# Patient Record
Sex: MALE | Race: BLACK OR AFRICAN AMERICAN | NOT HISPANIC OR LATINO | Employment: UNEMPLOYED | URBAN - METROPOLITAN AREA
[De-identification: names, ages, dates, MRNs, and addresses within clinical notes are randomized per-mention and may not be internally consistent; named-entity substitution may affect disease eponyms.]

---
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
## 2020-03-26 ENCOUNTER — APPOINTMENT (EMERGENCY)
Dept: RADIOLOGY | Facility: HOSPITAL | Age: 20
End: 2020-03-26
Payer: COMMERCIAL

## 2020-03-26 ENCOUNTER — HOSPITAL ENCOUNTER (EMERGENCY)
Facility: HOSPITAL | Age: 20
Discharge: HOME | End: 2020-03-26
Attending: EMERGENCY MEDICINE | Admitting: EMERGENCY MEDICINE
Payer: COMMERCIAL

## 2020-03-26 VITALS
WEIGHT: 125 LBS | SYSTOLIC BLOOD PRESSURE: 126 MMHG | HEIGHT: 65 IN | RESPIRATION RATE: 16 BRPM | HEART RATE: 95 BPM | DIASTOLIC BLOOD PRESSURE: 56 MMHG | OXYGEN SATURATION: 99 % | TEMPERATURE: 98.3 F | BODY MASS INDEX: 20.83 KG/M2

## 2020-03-26 DIAGNOSIS — M25.561 ACUTE PAIN OF RIGHT KNEE: Primary | ICD-10-CM

## 2020-03-26 PROCEDURE — 99283 EMERGENCY DEPT VISIT LOW MDM: CPT

## 2020-03-26 PROCEDURE — 99283 EMERGENCY DEPT VISIT LOW MDM: CPT | Performed by: EMERGENCY MEDICINE

## 2020-03-26 PROCEDURE — 73564 X-RAY EXAM KNEE 4 OR MORE: CPT

## 2020-03-26 RX ORDER — IBUPROFEN 600 MG/1
600 TABLET ORAL EVERY 6 HOURS PRN
Qty: 30 TABLET | Refills: 0 | Status: SHIPPED | OUTPATIENT
Start: 2020-03-26 | End: 2020-11-17

## 2020-03-26 NOTE — ED PROVIDER NOTES
History  Chief Complaint   Patient presents with    Knee Swelling     patient reports twisted rt knee running down the stairs 40 minutes prior to arrival     49-year-old male presents with right knee swelling states he was running down some stairs approximately 45 minutes ago felt his right knee twisted sudden pain to the knee  Patient now has tenderness and quite a bit swelling to the knee area  No other complaints no other injuries during the injury  History provided by:  Patient   used: No        Prior to Admission Medications   Prescriptions Last Dose Informant Patient Reported? Taking?   fluticasone (FLONASE) 50 mcg/act nasal spray   No No   Si spray into each nostril daily   ibuprofen (MOTRIN) 200 mg tablet  Self Yes No   Sig: Take by mouth every 6 (six) hours as needed for mild pain      Facility-Administered Medications: None       Past Medical History:   Diagnosis Date    Anesthesia complication     difficulty waking up    Piercing     ears    Testicle pain     varicele       Past Surgical History:   Procedure Laterality Date    APPENDECTOMY      ORCHIOPEXY Bilateral 2018    Procedure: LEFT SCROTAL EXPLORATION, BILATERAL ORCHIOPEXY;  Surgeon: Ely Garcia MD;  Location: [address] [place] [address];  Service: Urology    [place] EXCISE VARICOCELE Left 2019    Procedure: VARICOCELECTOMY;  Surgeon: Ely Garcia MD;  Location: [facility];  Service: Urology    TONSILLECTOMY         Family History   Problem Relation Age of Onset    No Known Problems Mother     Hypertension Father     Glaucoma Father     Asthma Brother     Asthma Brother      I have reviewed and agree with the history as documented      E-Cigarette/Vaping    E-Cigarette Use Former User      E-Cigarette/Vaping Substances     Social History     Tobacco Use    Smoking status: Current Some Day Smoker     Types: E-Cigarettes    Smokeless tobacco: Never Used   Substance Use Topics    Alcohol use: Yes     Comment: occ    Drug use: No       Review of Systems   Constitutional: Negative for activity change, chills, diaphoresis and fever  HENT: Negative for congestion, ear pain, nosebleeds, sore throat, trouble swallowing and voice change  Eyes: Negative for pain, discharge and redness  Respiratory: Negative for apnea, cough, choking, shortness of breath, wheezing and stridor  Cardiovascular: Negative for chest pain and palpitations  Gastrointestinal: Negative for abdominal distention, abdominal pain, constipation, diarrhea, nausea and vomiting  Endocrine: Negative for polydipsia  Genitourinary: Negative for difficulty urinating, dysuria, flank pain, frequency, hematuria and urgency  Musculoskeletal: Positive for arthralgias, joint swelling and myalgias  Negative for back pain, gait problem, neck pain and neck stiffness  Skin: Negative for pallor and rash  Neurological: Negative for dizziness, tremors, syncope, speech difficulty, weakness, numbness and headaches  Hematological: Negative for adenopathy  Psychiatric/Behavioral: Negative for confusion, hallucinations, self-injury and suicidal ideas  The patient is not nervous/anxious  Physical Exam  Physical Exam   Constitutional: He is oriented to person, place, and time  Vital signs are normal  He appears well-developed and well-nourished  No distress  HENT:   Head: Normocephalic and atraumatic  Right Ear: External ear normal    Left Ear: External ear normal    Nose: Nose normal    Mouth/Throat: Oropharynx is clear and moist    Eyes: Pupils are equal, round, and reactive to light  Conjunctivae are normal    Neck: Normal range of motion  Neck supple  Cardiovascular: Normal rate, regular rhythm, normal heart sounds and intact distal pulses  Pulmonary/Chest: Effort normal and breath sounds normal    Abdominal: Soft  Bowel sounds are normal    Musculoskeletal: Normal range of motion  He exhibits edema and tenderness     Patient has tenderness in the right knee with any movement at all and has swelling to the area  Neurological: He is alert and oriented to person, place, and time  He has normal reflexes  Skin: Skin is warm and dry  He is not diaphoretic  Psychiatric: He has a normal mood and affect  Nursing note and vitals reviewed  Vital Signs  ED Triage Vitals [03/26/20 1933]   Temperature Pulse Respirations Blood Pressure SpO2   98 3 °F (36 8 °C) 95 16 126/56 99 %      Temp Source Heart Rate Source Patient Position - Orthostatic VS BP Location FiO2 (%)   Oral Monitor Sitting Left arm --      Pain Score       8           Vitals:    03/26/20 1933   BP: 126/56   Pulse: 95   Patient Position - Orthostatic VS: Sitting         Visual Acuity      ED Medications  Medications - No data to display    Diagnostic Studies  Results Reviewed     None                 XR knee 4+ vw right injury    (Results Pending)   XR knee 4+ views Right injury    (Results Pending)              Procedures  Procedures         ED Course                                 MDM  Number of Diagnoses or Management Options  Diagnosis management comments: No obvious fractures on x-ray  I will place the patient in knee immobilizer ice elevation anti-inflammatories have him follow-up with orthopedics  Disposition  Final diagnoses:   None     ED Disposition     None      Follow-up Information    None         Patient's Medications   Discharge Prescriptions    No medications on file     No discharge procedures on file      PDMP Review     None          ED Provider  Electronically Signed by           Susan Verde DO  03/26/20 2045

## 2020-03-27 ENCOUNTER — OFFICE VISIT (OUTPATIENT)
Dept: OBGYN CLINIC | Facility: CLINIC | Age: 20
End: 2020-03-27
Payer: COMMERCIAL

## 2020-03-27 ENCOUNTER — TELEPHONE (OUTPATIENT)
Dept: OBGYN CLINIC | Facility: HOSPITAL | Age: 20
End: 2020-03-27

## 2020-03-27 ENCOUNTER — TELEPHONE (OUTPATIENT)
Dept: OBGYN CLINIC | Facility: CLINIC | Age: 20
End: 2020-03-27

## 2020-03-27 VITALS — HEIGHT: 65 IN | BODY MASS INDEX: 20.83 KG/M2 | WEIGHT: 125 LBS

## 2020-03-27 DIAGNOSIS — M23.91 INTERNAL DERANGEMENT OF RIGHT KNEE: Primary | ICD-10-CM

## 2020-03-27 DIAGNOSIS — M25.461 EFFUSION OF RIGHT KNEE: ICD-10-CM

## 2020-03-27 DIAGNOSIS — S83.8X1A SPRAIN OF OTHER LIGAMENT OF RIGHT KNEE, INITIAL ENCOUNTER: ICD-10-CM

## 2020-03-27 PROCEDURE — 99204 OFFICE O/P NEW MOD 45 MIN: CPT | Performed by: ORTHOPAEDIC SURGERY
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
## 2020-03-27 NOTE — TELEPHONE ENCOUNTER
[facility] called; need prior auth for patient whose MRI is scheduled 4/1/2020      Fax: [phone]   [phone]

## 2020-03-27 NOTE — LETTER
March 27, 2020     Patient: Veronica Farrar   YOB: 2000   Date of Visit: 3/27/2020       To Whom it May Concern:    Veronica Farrar is under my professional care  He was seen in my office on 3/27/2020  He is not cleared for work until further notice  If you have any questions or concerns, please don't hesitate to call           Sincerely,          Jaimie Flynn MD        CC: No Recipients

## 2020-03-29 ENCOUNTER — HOSPITAL ENCOUNTER (EMERGENCY)
Facility: HOSPITAL | Age: 20
Discharge: HOME | End: 2020-03-29
Attending: EMERGENCY MEDICINE | Admitting: EMERGENCY MEDICINE
Payer: COMMERCIAL

## 2020-03-29 VITALS
DIASTOLIC BLOOD PRESSURE: 72 MMHG | SYSTOLIC BLOOD PRESSURE: 135 MMHG | HEART RATE: 125 BPM | TEMPERATURE: 98.1 F | RESPIRATION RATE: 18 BRPM | OXYGEN SATURATION: 99 %

## 2020-03-29 DIAGNOSIS — M25.561 RIGHT KNEE PAIN: Primary | ICD-10-CM

## 2020-03-29 PROCEDURE — 99283 EMERGENCY DEPT VISIT LOW MDM: CPT

## 2020-03-29 PROCEDURE — 99284 EMERGENCY DEPT VISIT MOD MDM: CPT | Performed by: EMERGENCY MEDICINE

## 2020-03-29 RX ORDER — SENNOSIDES 8.6 MG
650 CAPSULE ORAL EVERY 8 HOURS PRN
Qty: 30 TABLET | Refills: 0 | Status: SHIPPED | OUTPATIENT
Start: 2020-03-29 | End: 2020-11-17

## 2020-03-29 RX ORDER — ACETAMINOPHEN 325 MG/1
650 TABLET ORAL ONCE
Status: COMPLETED | OUTPATIENT
Start: 2020-03-29 | End: 2020-03-29

## 2020-03-29 RX ORDER — TRAMADOL HYDROCHLORIDE 50 MG/1
50 TABLET ORAL EVERY 8 HOURS PRN
Qty: 15 TABLET | Refills: 0 | Status: SHIPPED | OUTPATIENT
Start: 2020-03-29 | End: 2020-04-08

## 2020-03-29 RX ORDER — TRAMADOL HYDROCHLORIDE 50 MG/1
50 TABLET ORAL ONCE
Status: DISCONTINUED | OUTPATIENT
Start: 2020-03-29 | End: 2020-03-29

## 2020-03-29 RX ORDER — NAPROXEN 500 MG/1
500 TABLET ORAL 2 TIMES DAILY WITH MEALS
Qty: 20 TABLET | Refills: 0 | Status: SHIPPED | OUTPATIENT
Start: 2020-03-29 | End: 2020-11-17

## 2020-03-29 RX ADMIN — ACETAMINOPHEN 650 MG: 325 TABLET ORAL at 01:15

## 2020-03-30 NOTE — TELEPHONE ENCOUNTER
MRI approved, spoke with Leigh Ann Zamora at [facility]  Thank you Shayy for obtaining the auth

## 2020-04-01 ENCOUNTER — TELEPHONE (OUTPATIENT)
Dept: OBGYN CLINIC | Facility: CLINIC | Age: 20
End: 2020-04-01

## 2020-04-02 ENCOUNTER — TELEPHONE (OUTPATIENT)
Dept: OBGYN CLINIC | Facility: HOSPITAL | Age: 20
End: 2020-04-02

## 2020-04-03 ENCOUNTER — OFFICE VISIT (OUTPATIENT)
Dept: OBGYN CLINIC | Facility: CLINIC | Age: 20
End: 2020-04-03
Payer: COMMERCIAL

## 2020-04-03 VITALS — DIASTOLIC BLOOD PRESSURE: 69 MMHG | SYSTOLIC BLOOD PRESSURE: 111 MMHG | HEART RATE: 80 BPM

## 2020-04-03 DIAGNOSIS — S83.511A RUPTURE OF ANTERIOR CRUCIATE LIGAMENT OF RIGHT KNEE, INITIAL ENCOUNTER: Primary | ICD-10-CM

## 2020-04-03 PROCEDURE — 99214 OFFICE O/P EST MOD 30 MIN: CPT | Performed by: ORTHOPAEDIC SURGERY

## 2020-04-06 ENCOUNTER — EVALUATION (OUTPATIENT)
Dept: PHYSICAL THERAPY | Facility: CLINIC | Age: 20
End: 2020-04-06
Payer: COMMERCIAL

## 2020-04-06 DIAGNOSIS — S83.511D RUPTURE OF ANTERIOR CRUCIATE LIGAMENT OF RIGHT KNEE, SUBSEQUENT ENCOUNTER: Primary | ICD-10-CM

## 2020-04-06 PROCEDURE — 97162 PT EVAL MOD COMPLEX 30 MIN: CPT

## 2020-04-08 ENCOUNTER — TELEPHONE (OUTPATIENT)
Dept: PHYSICAL THERAPY | Facility: CLINIC | Age: 20
End: 2020-04-08

## 2020-04-10 ENCOUNTER — OFFICE VISIT (OUTPATIENT)
Dept: PHYSICAL THERAPY | Facility: CLINIC | Age: 20
End: 2020-04-10
Payer: COMMERCIAL

## 2020-04-10 DIAGNOSIS — S83.511D RUPTURE OF ANTERIOR CRUCIATE LIGAMENT OF RIGHT KNEE, SUBSEQUENT ENCOUNTER: Primary | ICD-10-CM

## 2020-04-10 PROCEDURE — 97112 NEUROMUSCULAR REEDUCATION: CPT

## 2020-04-10 PROCEDURE — 97110 THERAPEUTIC EXERCISES: CPT

## 2020-04-10 PROCEDURE — 97140 MANUAL THERAPY 1/> REGIONS: CPT

## 2020-04-13 ENCOUNTER — TELEPHONE (OUTPATIENT)
Dept: PHYSICAL THERAPY | Facility: CLINIC | Age: 20
End: 2020-04-13

## 2020-04-14 ENCOUNTER — TELEPHONE (OUTPATIENT)
Dept: MRI IMAGING | Facility: HOSPITAL | Age: 20
End: 2020-04-14

## 2020-04-24 ENCOUNTER — TELEPHONE (OUTPATIENT)
Dept: PHYSICAL THERAPY | Facility: CLINIC | Age: 20
End: 2020-04-24

## 2020-04-27 ENCOUNTER — APPOINTMENT (OUTPATIENT)
Dept: PHYSICAL THERAPY | Facility: CLINIC | Age: 20
End: 2020-04-27
Payer: COMMERCIAL

## 2020-05-27 ENCOUNTER — OFFICE VISIT (OUTPATIENT)
Dept: OBGYN CLINIC | Facility: CLINIC | Age: 20
End: 2020-05-27
Payer: COMMERCIAL

## 2020-05-27 VITALS
DIASTOLIC BLOOD PRESSURE: 66 MMHG | HEART RATE: 69 BPM | WEIGHT: 120 LBS | HEIGHT: 65 IN | SYSTOLIC BLOOD PRESSURE: 106 MMHG | BODY MASS INDEX: 19.99 KG/M2 | TEMPERATURE: 98.2 F

## 2020-05-27 DIAGNOSIS — S83.511D RUPTURE OF ANTERIOR CRUCIATE LIGAMENT OF RIGHT KNEE, SUBSEQUENT ENCOUNTER: Primary | ICD-10-CM

## 2020-05-27 PROCEDURE — 99214 OFFICE O/P EST MOD 30 MIN: CPT | Performed by: ORTHOPAEDIC SURGERY

## 2020-06-14 DIAGNOSIS — Z11.59 SCREENING FOR VIRAL DISEASE: ICD-10-CM

## 2020-06-14 DIAGNOSIS — S83.511D RUPTURE OF ANTERIOR CRUCIATE LIGAMENT OF RIGHT KNEE, SUBSEQUENT ENCOUNTER: ICD-10-CM

## 2020-06-14 PROCEDURE — U0003 INFECTIOUS AGENT DETECTION BY NUCLEIC ACID (DNA OR RNA); SEVERE ACUTE RESPIRATORY SYNDROME CORONAVIRUS 2 (SARS-COV-2) (CORONAVIRUS DISEASE [COVID-19]), AMPLIFIED PROBE TECHNIQUE, MAKING USE OF HIGH THROUGHPUT TECHNOLOGIES AS DESCRIBED BY CMS-2020-01-R: HCPCS

## 2020-06-15 LAB — SARS-COV-2 RNA SPEC QL NAA+PROBE: NOT DETECTED

## 2020-06-18 ENCOUNTER — ANESTHESIA EVENT (OUTPATIENT)
Dept: PERIOP | Facility: HOSPITAL | Age: 20
End: 2020-06-18
Payer: COMMERCIAL

## 2020-06-18 ENCOUNTER — ANESTHESIA (OUTPATIENT)
Dept: PERIOP | Facility: HOSPITAL | Age: 20
End: 2020-06-18
Payer: COMMERCIAL

## 2020-06-18 ENCOUNTER — TELEPHONE (OUTPATIENT)
Dept: OBGYN CLINIC | Facility: HOSPITAL | Age: 20
End: 2020-06-18

## 2020-06-18 ENCOUNTER — HOSPITAL ENCOUNTER (OUTPATIENT)
Facility: HOSPITAL | Age: 20
Setting detail: OUTPATIENT SURGERY
Discharge: HOME | End: 2020-06-18
Attending: ORTHOPAEDIC SURGERY | Admitting: ORTHOPAEDIC SURGERY
Payer: COMMERCIAL

## 2020-06-18 VITALS
BODY MASS INDEX: 20.89 KG/M2 | HEART RATE: 64 BPM | DIASTOLIC BLOOD PRESSURE: 66 MMHG | WEIGHT: 130 LBS | HEIGHT: 66 IN | RESPIRATION RATE: 18 BRPM | OXYGEN SATURATION: 99 % | SYSTOLIC BLOOD PRESSURE: 125 MMHG | TEMPERATURE: 98.6 F

## 2020-06-18 DIAGNOSIS — S83.511D RUPTURE OF ANTERIOR CRUCIATE LIGAMENT OF RIGHT KNEE, SUBSEQUENT ENCOUNTER: Primary | ICD-10-CM

## 2020-06-18 PROCEDURE — C1713 ANCHOR/SCREW BN/BN,TIS/BN: HCPCS | Performed by: ORTHOPAEDIC SURGERY

## 2020-06-18 PROCEDURE — NC001 PR NO CHARGE: Performed by: PHYSICIAN ASSISTANT

## 2020-06-18 PROCEDURE — 29888 ARTHRS AID ACL RPR/AGMNTJ: CPT | Performed by: ORTHOPAEDIC SURGERY

## 2020-06-18 PROCEDURE — 29888 ARTHRS AID ACL RPR/AGMNTJ: CPT | Performed by: PHYSICIAN ASSISTANT

## 2020-06-18 PROCEDURE — C9290 INJ, BUPIVACAINE LIPOSOME: HCPCS | Performed by: ANESTHESIOLOGY

## 2020-06-18 DEVICE — IMPLANT TIGHTROPE ACL W/FIBERTAG RT: Type: IMPLANTABLE DEVICE | Site: KNEE | Status: FUNCTIONAL

## 2020-06-18 DEVICE — IMPLANT TIGHTROPE ACL W/FIBERTAG ABS: Type: IMPLANTABLE DEVICE | Site: KNEE | Status: FUNCTIONAL

## 2020-06-18 DEVICE — TIGHTROPE ABS BUTTON RND CONCAVE 14MM: Type: IMPLANTABLE DEVICE | Site: KNEE | Status: FUNCTIONAL

## 2020-06-18 RX ORDER — CLINDAMYCIN PHOSPHATE 900 MG/50ML
900 INJECTION INTRAVENOUS ONCE
Status: COMPLETED | OUTPATIENT
Start: 2020-06-18 | End: 2020-06-18

## 2020-06-18 RX ORDER — MAGNESIUM HYDROXIDE 1200 MG/15ML
LIQUID ORAL AS NEEDED
Status: DISCONTINUED | OUTPATIENT
Start: 2020-06-18 | End: 2020-06-18 | Stop reason: HOSPADM

## 2020-06-18 RX ORDER — FENTANYL CITRATE 50 UG/ML
INJECTION, SOLUTION INTRAMUSCULAR; INTRAVENOUS AS NEEDED
Status: DISCONTINUED | OUTPATIENT
Start: 2020-06-18 | End: 2020-06-18 | Stop reason: SURG

## 2020-06-18 RX ORDER — KETOROLAC TROMETHAMINE 30 MG/ML
INJECTION, SOLUTION INTRAMUSCULAR; INTRAVENOUS AS NEEDED
Status: DISCONTINUED | OUTPATIENT
Start: 2020-06-18 | End: 2020-06-18 | Stop reason: SURG

## 2020-06-18 RX ORDER — OXYCODONE HYDROCHLORIDE AND ACETAMINOPHEN 5; 325 MG/1; MG/1
1 TABLET ORAL EVERY 4 HOURS PRN
Status: DISCONTINUED | OUTPATIENT
Start: 2020-06-18 | End: 2020-06-18 | Stop reason: HOSPADM

## 2020-06-18 RX ORDER — LIDOCAINE HYDROCHLORIDE 10 MG/ML
INJECTION, SOLUTION EPIDURAL; INFILTRATION; INTRACAUDAL; PERINEURAL AS NEEDED
Status: DISCONTINUED | OUTPATIENT
Start: 2020-06-18 | End: 2020-06-18 | Stop reason: SURG

## 2020-06-18 RX ORDER — CLINDAMYCIN HYDROCHLORIDE 300 MG/1
300 CAPSULE ORAL 4 TIMES DAILY
Qty: 4 CAPSULE | Refills: 0 | Status: SHIPPED | OUTPATIENT
Start: 2020-06-18 | End: 2020-06-19

## 2020-06-18 RX ORDER — ONDANSETRON 2 MG/ML
INJECTION INTRAMUSCULAR; INTRAVENOUS AS NEEDED
Status: DISCONTINUED | OUTPATIENT
Start: 2020-06-18 | End: 2020-06-18 | Stop reason: SURG

## 2020-06-18 RX ORDER — FENTANYL CITRATE/PF 50 MCG/ML
50 SYRINGE (ML) INJECTION
Status: DISCONTINUED | OUTPATIENT
Start: 2020-06-18 | End: 2020-06-18 | Stop reason: HOSPADM

## 2020-06-18 RX ORDER — DEXAMETHASONE SODIUM PHOSPHATE 4 MG/ML
INJECTION, SOLUTION INTRA-ARTICULAR; INTRALESIONAL; INTRAMUSCULAR; INTRAVENOUS; SOFT TISSUE AS NEEDED
Status: DISCONTINUED | OUTPATIENT
Start: 2020-06-18 | End: 2020-06-18 | Stop reason: SURG

## 2020-06-18 RX ORDER — BUPIVACAINE HYDROCHLORIDE 2.5 MG/ML
INJECTION, SOLUTION EPIDURAL; INFILTRATION; INTRACAUDAL
Status: COMPLETED | OUTPATIENT
Start: 2020-06-18 | End: 2020-06-18

## 2020-06-18 RX ORDER — OXYCODONE HYDROCHLORIDE AND ACETAMINOPHEN 5; 325 MG/1; MG/1
1 TABLET ORAL EVERY 4 HOURS PRN
Qty: 15 TABLET | Refills: 0 | Status: SHIPPED | OUTPATIENT
Start: 2020-06-18 | End: 2020-11-17

## 2020-06-18 RX ORDER — PROPOFOL 10 MG/ML
INJECTION, EMULSION INTRAVENOUS AS NEEDED
Status: DISCONTINUED | OUTPATIENT
Start: 2020-06-18 | End: 2020-06-18 | Stop reason: SURG

## 2020-06-18 RX ADMIN — PROPOFOL 200 MG: 10 INJECTION, EMULSION INTRAVENOUS at 12:08

## 2020-06-18 RX ADMIN — LIDOCAINE HYDROCHLORIDE 50 MG: 10 INJECTION, SOLUTION EPIDURAL; INFILTRATION; INTRACAUDAL; PERINEURAL at 12:08

## 2020-06-18 RX ADMIN — KETOROLAC TROMETHAMINE 30 MG: 30 INJECTION, SOLUTION INTRAMUSCULAR at 13:37

## 2020-06-18 RX ADMIN — FENTANYL CITRATE 25 MCG: 50 INJECTION, SOLUTION INTRAMUSCULAR; INTRAVENOUS at 13:37

## 2020-06-18 RX ADMIN — FENTANYL CITRATE 50 MCG: 50 INJECTION, SOLUTION INTRAMUSCULAR; INTRAVENOUS at 14:03

## 2020-06-18 RX ADMIN — DEXAMETHASONE SODIUM PHOSPHATE 4 MG: 4 INJECTION, SOLUTION INTRA-ARTICULAR; INTRALESIONAL; INTRAMUSCULAR; INTRAVENOUS; SOFT TISSUE at 12:21

## 2020-06-18 RX ADMIN — BUPIVACAINE HYDROCHLORIDE 10 ML: 2.5 INJECTION, SOLUTION EPIDURAL; INFILTRATION; INTRACAUDAL at 11:40

## 2020-06-18 RX ADMIN — ONDANSETRON 4 MG: 2 INJECTION INTRAMUSCULAR; INTRAVENOUS at 12:21

## 2020-06-18 RX ADMIN — PROPOFOL 50 MG: 10 INJECTION, EMULSION INTRAVENOUS at 12:09

## 2020-06-18 RX ADMIN — FENTANYL CITRATE 25 MCG: 50 INJECTION, SOLUTION INTRAMUSCULAR; INTRAVENOUS at 13:03

## 2020-06-18 RX ADMIN — FENTANYL CITRATE 50 MCG: 50 INJECTION, SOLUTION INTRAMUSCULAR; INTRAVENOUS at 12:08

## 2020-06-18 RX ADMIN — OXYCODONE HYDROCHLORIDE AND ACETAMINOPHEN 1 TABLET: 5; 325 TABLET ORAL at 14:20

## 2020-06-18 RX ADMIN — FENTANYL CITRATE 50 MCG: 50 INJECTION, SOLUTION INTRAMUSCULAR; INTRAVENOUS at 14:01

## 2020-06-18 RX ADMIN — CLINDAMYCIN PHOSPHATE 900 MG: 18 INJECTION, SOLUTION INTRAMUSCULAR; INTRAVENOUS at 12:07

## 2020-06-19 ENCOUNTER — EVALUATION (OUTPATIENT)
Dept: PHYSICAL THERAPY | Facility: CLINIC | Age: 20
End: 2020-06-19
Payer: COMMERCIAL

## 2020-06-19 DIAGNOSIS — S83.511D RUPTURE OF ANTERIOR CRUCIATE LIGAMENT OF RIGHT KNEE, SUBSEQUENT ENCOUNTER: Primary | ICD-10-CM

## 2020-06-19 DIAGNOSIS — M25.561 ACUTE PAIN OF RIGHT KNEE: ICD-10-CM

## 2020-06-19 DIAGNOSIS — M62.559 ATROPHY OF QUADRICEPS FEMORIS MUSCLE: ICD-10-CM

## 2020-06-19 PROCEDURE — 97161 PT EVAL LOW COMPLEX 20 MIN: CPT

## 2020-06-22 ENCOUNTER — OFFICE VISIT (OUTPATIENT)
Dept: PHYSICAL THERAPY | Facility: CLINIC | Age: 20
End: 2020-06-22
Payer: COMMERCIAL

## 2020-06-22 DIAGNOSIS — M25.561 ACUTE PAIN OF RIGHT KNEE: ICD-10-CM

## 2020-06-22 DIAGNOSIS — M62.559 ATROPHY OF QUADRICEPS FEMORIS MUSCLE: ICD-10-CM

## 2020-06-22 DIAGNOSIS — S83.511D RUPTURE OF ANTERIOR CRUCIATE LIGAMENT OF RIGHT KNEE, SUBSEQUENT ENCOUNTER: Primary | ICD-10-CM

## 2020-06-22 PROCEDURE — 97110 THERAPEUTIC EXERCISES: CPT

## 2020-06-24 ENCOUNTER — OFFICE VISIT (OUTPATIENT)
Dept: PHYSICAL THERAPY | Facility: CLINIC | Age: 20
End: 2020-06-24
Payer: COMMERCIAL

## 2020-06-24 DIAGNOSIS — M25.561 ACUTE PAIN OF RIGHT KNEE: ICD-10-CM

## 2020-06-24 DIAGNOSIS — S83.511D RUPTURE OF ANTERIOR CRUCIATE LIGAMENT OF RIGHT KNEE, SUBSEQUENT ENCOUNTER: Primary | ICD-10-CM

## 2020-06-24 DIAGNOSIS — M62.559 ATROPHY OF QUADRICEPS FEMORIS MUSCLE: ICD-10-CM

## 2020-06-24 PROCEDURE — 97110 THERAPEUTIC EXERCISES: CPT

## 2020-06-24 PROCEDURE — 97112 NEUROMUSCULAR REEDUCATION: CPT

## 2020-06-26 ENCOUNTER — OFFICE VISIT (OUTPATIENT)
Dept: PHYSICAL THERAPY | Facility: CLINIC | Age: 20
End: 2020-06-26
Payer: COMMERCIAL

## 2020-06-26 ENCOUNTER — OFFICE VISIT (OUTPATIENT)
Dept: OBGYN CLINIC | Facility: CLINIC | Age: 20
End: 2020-06-26

## 2020-06-26 DIAGNOSIS — M25.561 ACUTE PAIN OF RIGHT KNEE: ICD-10-CM

## 2020-06-26 DIAGNOSIS — M62.559 ATROPHY OF QUADRICEPS FEMORIS MUSCLE: ICD-10-CM

## 2020-06-26 DIAGNOSIS — S83.511D RUPTURE OF ANTERIOR CRUCIATE LIGAMENT OF RIGHT KNEE, SUBSEQUENT ENCOUNTER: Primary | ICD-10-CM

## 2020-06-26 DIAGNOSIS — M25.461 EFFUSION OF RIGHT KNEE: Primary | ICD-10-CM

## 2020-06-26 PROCEDURE — 97112 NEUROMUSCULAR REEDUCATION: CPT

## 2020-06-26 PROCEDURE — 97140 MANUAL THERAPY 1/> REGIONS: CPT

## 2020-06-26 PROCEDURE — 99024 POSTOP FOLLOW-UP VISIT: CPT | Performed by: ORTHOPAEDIC SURGERY

## 2020-06-26 PROCEDURE — 97110 THERAPEUTIC EXERCISES: CPT

## 2020-06-30 ENCOUNTER — OFFICE VISIT (OUTPATIENT)
Dept: PHYSICAL THERAPY | Facility: CLINIC | Age: 20
End: 2020-06-30
Payer: COMMERCIAL

## 2020-06-30 DIAGNOSIS — M25.561 ACUTE PAIN OF RIGHT KNEE: ICD-10-CM

## 2020-06-30 DIAGNOSIS — S83.511D RUPTURE OF ANTERIOR CRUCIATE LIGAMENT OF RIGHT KNEE, SUBSEQUENT ENCOUNTER: Primary | ICD-10-CM

## 2020-06-30 DIAGNOSIS — M62.559 ATROPHY OF QUADRICEPS FEMORIS MUSCLE: ICD-10-CM

## 2020-06-30 PROCEDURE — 97112 NEUROMUSCULAR REEDUCATION: CPT

## 2020-06-30 PROCEDURE — 97140 MANUAL THERAPY 1/> REGIONS: CPT

## 2020-06-30 PROCEDURE — 97110 THERAPEUTIC EXERCISES: CPT

## 2020-07-02 ENCOUNTER — OFFICE VISIT (OUTPATIENT)
Dept: PHYSICAL THERAPY | Facility: CLINIC | Age: 20
End: 2020-07-02
Payer: COMMERCIAL

## 2020-07-02 DIAGNOSIS — M62.559 ATROPHY OF QUADRICEPS FEMORIS MUSCLE: ICD-10-CM

## 2020-07-02 DIAGNOSIS — M25.561 ACUTE PAIN OF RIGHT KNEE: ICD-10-CM

## 2020-07-02 DIAGNOSIS — S83.511D RUPTURE OF ANTERIOR CRUCIATE LIGAMENT OF RIGHT KNEE, SUBSEQUENT ENCOUNTER: Primary | ICD-10-CM

## 2020-07-02 PROCEDURE — 97110 THERAPEUTIC EXERCISES: CPT

## 2020-07-02 PROCEDURE — 97140 MANUAL THERAPY 1/> REGIONS: CPT

## 2020-07-02 NOTE — PROGRESS NOTES
Daily Note     Today's date: 2020  Patient name: Kathleen Oliveros  : 2000  MRN: [id number]  Referring provider: Saran Haley  Dx:   Encounter Diagnosis     ICD-10-CM    1  Rupture of anterior cruciate ligament of right knee, subsequent encounter S83 511D    2  Acute pain of right knee M25 561    3  Atrophy of quadriceps femoris muscle M62 559        Start Time: 0800  Stop Time: 1229  Total time in clinic (min): 50 minutes    Subjective: Patient reports 4/10 pain in his R knee today  He is trying to perform all exercises at home as often as possible in order to discontinue crutches  Objective: See treatment diary below  FOTO score: 25%      Assessment: Tolerated treatment well  Patient would benefit from continued PT to improve R knee ROM and strength, improve overall functional mobility, and progress as able  Patient was educated on when to discontinue use of crutches and performing normalized gait pattern  He was educated on using one axillary crutch when ambulating from the living room to the bathroom/kitchen  Patient no longer requires AAROM to perform SLR  Patient still exhibits quad lag when performing SLR  Hold on unlocking brace at this time  Retrograde massage was utilized to decrease R knee swelling  By end of session, patient's swelling dissipated  Continue to progress patient as able  Plan: Continue per plan of care        Precautions: Standard, ACL reconstruction 2020      Manuals  7       R patellar mobs  AR  AR AR AR       R knee PROM  AR  AR  AR       STM ITB, quad   AR AR AR        Retrograde massage       AR       R knee ext    0 deg 0 deg 0 deg       R knee flex    70 deg 87 deg 90 deg       Neuro Re-Ed             Quad Sets   5s x 10 5s x 10 5s x 10 5s x 10                                                                                      Ther Ex             Bike warmup    5' 5' 5'       SLR flex, abd, add  2x10 2 x 10 2 x 10 AAROM 2 x 10  3 x 10       Ankle pumps   x20 x20          Gastroc stretch  3 x 30s 3 x 30s 3 x 30s 3x 30s 3 x 30s       Heel slides    2 x 10 2 x 10 2 x 10 2 x 10       Ankle DF/PF   2 x 10 GTB 2 x 10 GTB  2 x 10 GTB        Seated knee flex      2 x 10       Calf raises    2 x 10 2 x 10 2x10 2 x 10       Mini squats     2 x 10 2 x 10 2 x 10       Isometrics             Prone hang   2 x 1'  2 x 1' 2 x 1'       Ther Activity                                       Gait Training             Stair training  Rev Rev           Ambulation with and without crutches      Rev       Squat training Rev  Rev           Modalities             Ice post   10' 10' 10' 10' 10'

## 2020-07-07 ENCOUNTER — OFFICE VISIT (OUTPATIENT)
Dept: PHYSICAL THERAPY | Facility: CLINIC | Age: 20
End: 2020-07-07
Payer: COMMERCIAL

## 2020-07-07 DIAGNOSIS — S83.511D RUPTURE OF ANTERIOR CRUCIATE LIGAMENT OF RIGHT KNEE, SUBSEQUENT ENCOUNTER: Primary | ICD-10-CM

## 2020-07-07 DIAGNOSIS — M62.559 ATROPHY OF QUADRICEPS FEMORIS MUSCLE: ICD-10-CM

## 2020-07-07 DIAGNOSIS — M25.561 ACUTE PAIN OF RIGHT KNEE: ICD-10-CM

## 2020-07-07 PROCEDURE — 97140 MANUAL THERAPY 1/> REGIONS: CPT

## 2020-07-07 PROCEDURE — 97112 NEUROMUSCULAR REEDUCATION: CPT

## 2020-07-07 PROCEDURE — 97110 THERAPEUTIC EXERCISES: CPT

## 2020-07-07 NOTE — PROGRESS NOTES
Daily Note     Today's date: 2020  Patient name: Pavan Hebert  : 2000  MRN: [id number]  Referring provider: Abiodun Wu  Dx:   Encounter Diagnosis     ICD-10-CM    1  Rupture of anterior cruciate ligament of right knee, subsequent encounter S83 511D    2  Acute pain of right knee M25 561    3  Atrophy of quadriceps femoris muscle M62 559        Start Time: 0800  Stop Time: 9580  Total time in clinic (min): 50 minutes    Subjective: Patient reports 2/10 R knee pain today  He feels like his swelling is going down  On his CPM, he is achieving 87 degrees of R knee flexion at this time  He has not walked without the crutches due to increased instability in his R knee  Objective: See treatment diary below      Assessment: Tolerated treatment well  Patient would benefit from continued PT to improve R knee ROM and strength, and improve overall functional abilities  Quad sets were performed with NMES today due to significant R quad atrophy, weakness, and inability to produce visibile quad set  Patient was instructed on performing quad contraction with NMES in the clinic and at home  Patient demonstrates significant quad lag at this time  Brace will remained locked until he can produce a quad set and no lag during a SLR  Continue to progress patient as able  Plan: Continue per plan of care        Precautions: Standard, ACL reconstruction 2020      Manuals       R patellar mobs  AR  AR AR AR       R knee PROM  AR  AR  AR       STM ITB, quad   AR AR AR  AR      Retrograde massage       AR AR      R knee ext    0 deg 0 deg 0 deg 0 deg      R knee flex    70 deg 87 deg 90 deg 95 deg      Neuro Re-Ed             Quad Sets   5s x 10 5s x 10 5s x 10 5s x 10  5s x 10 with NMES (10')                                                                                    Ther Ex             Bike warmup    5' 5' 5' 5'      SLR flex, abd, add  2x10 2 x 10 2 x 10 AAROM 2 x 10  3 x 10 3 x 10      Ankle pumps   x20 x20          Gastroc stretch  3 x 30s 3 x 30s 3 x 30s 3x 30s 3 x 30s 3 x 30s      Heel slides    2 x 10 2 x 10 2 x 10 2 x 10 2 x 10      Ankle DF/PF   2 x 10 GTB 2 x 10 GTB  2 x 10 GTB        Seated knee flex      2 x 10       Calf raises    2 x 10 2 x 10 2x10 2 x 10 2 x 10      Mini squats     2 x 10 2 x 10 2 x 10 3 x 10      Isometrics             Prone hang   2 x 1'  2 x 1' 2 x 1'       Ther Activity                                       Gait Training             Stair training  Rev Rev           Ambulation with and without crutches      Rev Rev      Squat training Rev  Rev           Modalities             Ice post   10' 10' 10' 10' 10' 10'

## 2020-07-09 ENCOUNTER — OFFICE VISIT (OUTPATIENT)
Dept: PHYSICAL THERAPY | Facility: CLINIC | Age: 20
End: 2020-07-09
Payer: COMMERCIAL

## 2020-07-09 DIAGNOSIS — S83.511D RUPTURE OF ANTERIOR CRUCIATE LIGAMENT OF RIGHT KNEE, SUBSEQUENT ENCOUNTER: Primary | ICD-10-CM

## 2020-07-09 DIAGNOSIS — M25.561 ACUTE PAIN OF RIGHT KNEE: ICD-10-CM

## 2020-07-09 DIAGNOSIS — M62.559 ATROPHY OF QUADRICEPS FEMORIS MUSCLE: ICD-10-CM

## 2020-07-09 PROCEDURE — 97112 NEUROMUSCULAR REEDUCATION: CPT

## 2020-07-09 PROCEDURE — 97110 THERAPEUTIC EXERCISES: CPT

## 2020-07-09 NOTE — PROGRESS NOTES
Daily Note     Today's date: 2020  Patient name: Florence Downey  : 2000  MRN: [id number]  Referring provider: Sonya Olivas  Dx:   Encounter Diagnosis     ICD-10-CM    1  Rupture of anterior cruciate ligament of right knee, subsequent encounter S83 511D    2  Acute pain of right knee M25 561    3  Atrophy of quadriceps femoris muscle M62 559        Start Time: 0845  Stop Time: 94  Total time in clinic (min): 50 minutes    Subjective: Patient denies pain at this time  He has been ambulating around the house with 1 axillary crutch and the brace locked at 0 deg ext  He feels like his quad strength is starting to return, with less noticeable atrophy  Objective: See treatment diary below      Assessment: Tolerated treatment well  Patient would benefit from continued PT to decrease R knee swelling, improve quad activation and LE strength, and improve functional mobility  Patient demonstrates 0 deg R knee ext  He was educated on importance of maintaining R knee ext  Patient is able to sleep without brace at this time as per protocol  Patient demonstrates ability to ambulate without crutches, and with no pain  He was educated on importance of performing proper heel strike, despite lack of full ROM of R knee  He tolerated progressions well  Patient lacks proper quad activation at this time  Atrophy is noted in R quad  Patient was educated on use of NMES at home with quad set  Continue to progress patient as able  Plan: Continue per plan of care        Precautions: Standard, ACL reconstruction 2020      Manuals  7     R patellar mobs  AR  AR AR AR       R knee PROM  AR  AR  AR  AR     STM ITB, quad   AR AR AR  AR      Retrograde massage       AR AR      R knee ext    0 deg 0 deg 0 deg 0 deg 0 deg     R knee flex    70 deg 87 deg 90 deg 95 deg 110 deg     Neuro Re-Ed             Quad Sets   5s x 10 5s x 10 5s x 10 5s x 10  5s x 10 with NMES (10') 5s x 10 with NMES                                                                                   Ther Ex             Bike warmup    5' 5' 5' 5' 5'     SLR flex, abd, add  2x10 2 x 10 2 x 10 AAROM 2 x 10  3 x 10 3 x 10 3 x 10     Gastroc stretch, HS stretch  3 x 30s 3 x 30s 3 x 30s 3x 30s 3 x 30s 3 x 30s      Heel slides    2 x 10 2 x 10 2 x 10 2 x 10 2 x 10 2 x 10     Seated knee flex      2 x 10       Calf raises    2 x 10 2 x 10 2x10 2 x 10 2 x 10 2 x 10     Mini squats     2 x 10 2 x 10 2 x 10 3 x 10 3 x 10 80deg     Step ups        Next visit     TKE        Next visit     Standing hip flex, abd, ext        2 x 10     Prone HS curls         2 x 10     Prone hang   2 x 1'  2 x 1' 2 x 1'  1 x 1'     Ther Activity                                       Gait Training             Stair training  Rev Rev           Ambulation with and without crutches      Rev Rev      Squat training Rev  Rev           Modalities             Ice post   10' 10' 10' 10' 10' 10' 10'

## 2020-07-10 ENCOUNTER — OFFICE VISIT (OUTPATIENT)
Dept: PHYSICAL THERAPY | Facility: CLINIC | Age: 20
End: 2020-07-10
Payer: COMMERCIAL

## 2020-07-10 DIAGNOSIS — M25.561 ACUTE PAIN OF RIGHT KNEE: ICD-10-CM

## 2020-07-10 DIAGNOSIS — M62.559 ATROPHY OF QUADRICEPS FEMORIS MUSCLE: ICD-10-CM

## 2020-07-10 DIAGNOSIS — S83.511D RUPTURE OF ANTERIOR CRUCIATE LIGAMENT OF RIGHT KNEE, SUBSEQUENT ENCOUNTER: Primary | ICD-10-CM

## 2020-07-10 PROCEDURE — 97140 MANUAL THERAPY 1/> REGIONS: CPT

## 2020-07-10 PROCEDURE — 97110 THERAPEUTIC EXERCISES: CPT

## 2020-07-10 NOTE — PROGRESS NOTES
Daily Note     Today's date: 7/10/2020  Patient name: Michael Antony  : 2000  MRN: [id number]  Referring provider: Ministerio Maddox  Dx:   Encounter Diagnosis     ICD-10-CM    1  Rupture of anterior cruciate ligament of right knee, subsequent encounter S83 511D    2  Acute pain of right knee M25 561    3  Atrophy of quadriceps femoris muscle M62 559        Start Time: 0800  Stop Time: 1088  Total time in clinic (min): 50 minutes    Subjective: Patient denies pain in R knee at this time  He is ambulating at home without crutches at this time  Patient has been sitting with the brace unlocked at 80 deg  This does not cause him any increased pain  He feels as though he is "unstable" at this time due to weakness of R quad  Objective: See treatment diary below      Assessment: Tolerated treatment well  Patient would benefit from continued PT to decrease R knee swelling, improve ROM and strength, and improve activation of R quadriceps  Patient demonstrates improvements in ROM and strength  Patient presents with -25 deg quad lag at this time in his R knee  Patient was educated on importance of performing quad sets at home with help of NMES in order to ambulate efficiently  Patient is able to perform partial squats with 80 deg of knee flexion without having increased pain  Scar mobilization was utilized at end of session to improve mobility and decrease sensitivity  Continue to progress as per protocol  Plan: Continue per plan of care        Precautions: Standard, ACL reconstruction 2020      Manuals  7 7/9 7/10    R patellar mobs  AR  AR AR AR   AR    R knee PROM  AR  AR  AR  AR AR    STM ITB, quad   AR AR AR  AR  AR    Retrograde massage       AR AR      R knee ext    0 deg 0 deg 0 deg 0 deg 0 deg 0 deg    R knee flex    70 deg 87 deg 90 deg 95 deg 110 deg 112 deg    Neuro Re-Ed             Quad Sets   5s x 10 5s x 10 5s x 10 5s x 10  5s x 10 with NMES (10') 5s x 10 with NMES 5s x 20    Clamshells             Bridge          Hold until no quad lag    +on ball             +SL             +with HS curl             + hip hike                          Ther Ex             Bike warmup    5' 5' 5' 5' 5' 5'    SLR flex, abd, add  2x10 2 x 10 2 x 10 AAROM 2 x 10  3 x 10 3 x 10 3 x 10 3 x 10    Gastroc stretch, HS stretch  3 x 30s 3 x 30s 3 x 30s 3x 30s 3 x 30s 3 x 30s      Heel slides    2 x 10 2 x 10 2 x 10 2 x 10 2 x 10 2 x 10 2 x 10    Ball squats, wall squats          Next visit    Calf raises    2 x 10 2 x 10 2x10 2 x 10 2 x 10 2 x 10 2 x 10    Mini squats     2 x 10 2 x 10 2 x 10 3 x 10 3 x 10 80deg 3 x 10 80 deg    Step ups        Next visit x10 no risers    TKE         Next visit    Standing hip flex, abd, ext        2 x 10     Standing marches         2 x 10    Prone HS curls         2 x 10 2 x 10    Lateral step overs              Peanut knee flex         2 x 10    Prone hang   2 x 1'  2 x 1' 2 x 1'  1 x 1'     Ther Activity                                       Gait Training             Stair training  Rev Rev           Ambulation with and without crutches      Rev Rev  Rev    Squat training Rev  Rev           Modalities             Ice post   10' 10' 10' 10' 10' 10' 10' 10'

## 2020-07-14 ENCOUNTER — TELEPHONE (OUTPATIENT)
Dept: OBGYN CLINIC | Facility: CLINIC | Age: 20
End: 2020-07-14

## 2020-07-14 ENCOUNTER — OFFICE VISIT (OUTPATIENT)
Dept: OBGYN CLINIC | Facility: CLINIC | Age: 20
End: 2020-07-14

## 2020-07-14 ENCOUNTER — OFFICE VISIT (OUTPATIENT)
Dept: PHYSICAL THERAPY | Facility: CLINIC | Age: 20
End: 2020-07-14
Payer: COMMERCIAL

## 2020-07-14 VITALS — SYSTOLIC BLOOD PRESSURE: 127 MMHG | HEART RATE: 75 BPM | DIASTOLIC BLOOD PRESSURE: 78 MMHG | TEMPERATURE: 98 F

## 2020-07-14 DIAGNOSIS — M25.561 ACUTE PAIN OF RIGHT KNEE: ICD-10-CM

## 2020-07-14 DIAGNOSIS — S83.511D RUPTURE OF ANTERIOR CRUCIATE LIGAMENT OF RIGHT KNEE, SUBSEQUENT ENCOUNTER: Primary | ICD-10-CM

## 2020-07-14 DIAGNOSIS — M62.559 ATROPHY OF QUADRICEPS FEMORIS MUSCLE: ICD-10-CM

## 2020-07-14 DIAGNOSIS — Z98.890 S/P ACL RECONSTRUCTION: Primary | ICD-10-CM

## 2020-07-14 PROCEDURE — 97112 NEUROMUSCULAR REEDUCATION: CPT

## 2020-07-14 PROCEDURE — 99024 POSTOP FOLLOW-UP VISIT: CPT | Performed by: ORTHOPAEDIC SURGERY

## 2020-07-14 PROCEDURE — 97110 THERAPEUTIC EXERCISES: CPT

## 2020-07-14 NOTE — TELEPHONE ENCOUNTER
Spoke to patient,he can come in today  Office approved force on for today  Patient stated he can make it for 10:45  Vianey Hickman RN forced appointment on with permission from the office   Thank you

## 2020-07-14 NOTE — TELEPHONE ENCOUNTER
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  Please call him [phone]  Thank you

## 2020-07-14 NOTE — PROGRESS NOTES
Daily Note     Today's date: 2020  Patient name: Buddy Flores  : 2000  MRN: [id number]  Referring provider: Juventino Jamison  Dx:   Encounter Diagnosis     ICD-10-CM    1  Rupture of anterior cruciate ligament of right knee, subsequent encounter S83 511D    2  Acute pain of right knee M25 561    3  Atrophy of quadriceps femoris muscle M62 559        Start Time: 0800  Stop Time: 5907  Total time in clinic (min): 55 minutes    Subjective: Patient denies pain in his R knee today  He is having increased tightness in his R knee at this time  Over the weekend, patient was performing scar massage, and noticed a stitch was present  He is concerned due to increased drainage  Objective: See treatment diary below      Assessment: Tolerated treatment well  Patient would benefit from continued PT to decrease R knee swelling, improve ROM and strength, and overall functional mobility  Patient was informed to take a picture of his incision where the stitches has surfaced to assess from day to day the amount of drainage and size of hole  If wound opens or seems infected, he was informed to contact MD  Patient exhibited increased fatigue throughout today's session  R quad lag is noted due to quad atrophy  Continue to activate R quad musculature throughout treatment session  Patient demonstrates improvements in R knee flexion ROM, but lacks strength in R LE at this time  Continue to progress as per protocol  Plan: Continue per plan of care        Precautions: Standard, ACL reconstruction 2020      Manuals  7/ 7 7/ 7/10 7   R patellar mobs  AR  AR AR AR   AR    R knee PROM  AR  AR  AR  AR AR    STM ITB, quad   AR AR AR  AR  AR    Retrograde massage       AR AR      R knee ext    0 deg 0 deg 0 deg 0 deg 0 deg 0 deg 0 deg   R knee flex    70 deg 87 deg 90 deg 95 deg 110 deg 112 deg 115 deg   Neuro Re-Ed             Quad Sets   5s x 10 5s x 10 5s x 10 5s x 10  5s x 10 with NMES (10') 5s x 10 with NMES 5s x 20 5s x 20   Clamshells          2 x 10 GTB   Bridge          Hold until no quad lag    +on ball             +SL             +with HS curl             + hip hike                          Ther Ex             Bike warmup    5' 5' 5' 5' 5' 5' 5'   SLR flex, abd, add  2x10 2 x 10 2 x 10 AAROM 2 x 10  3 x 10 3 x 10 3 x 10 3 x 10 3 x 10   Gastroc stretch, HS stretch  3 x 30s 3 x 30s 3 x 30s 3x 30s 3 x 30s 3 x 30s      Heel slides    2 x 10 2 x 10 2 x 10 2 x 10 2 x 10 2 x 10 2 x 10 3x10   Ball squats, wall squats              Calf raises    2 x 10 2 x 10 2x10 2 x 10 2 x 10 2 x 10 2 x 10 2 x 10   Mini squats     2 x 10 2 x 10 2 x 10 3 x 10 3 x 10 80deg 3 x 10 80 deg 3 x 20   90 deg   Step ups        Next visit x10 no risers 2x10 no risers   TKE         Next visit 3s x 10   Standing hip flex, abd, ext        2 x 10     Standing marches         2 x 10 2 x 10   Prone HS curls         2 x 10 2 x 10 2 x 10   Lateral step overs           2 x 10   Peanut knee flex         2 x 10    Prone hang   2 x 1'  2 x 1' 2 x 1'  1 x 1'     Ther Activity                                       Gait Training             Stair training  Rev Rev           Ambulation with and without crutches      Rev Rev  Rev    Squat training Rev  Rev           Modalities             Ice post   10' 10' 10' 10' 10' 10' 10' 10' 10'

## 2020-07-14 NOTE — PROGRESS NOTES
Assessment/Plan:  1  S/P ACL reconstruction       I did remove the patient's spitting stitch today  There were no signs of infection  He will follow-up as previously scheduled  Subjective:   Ady Thomas is a 21 y o  male who presents today for follow-up of his right knee, now about a month status post ACL reconstruction with quadriceps tendon autograft  He presents earlier than his scheduled appointment as he noted a small suture coming from his quadriceps tendon harvest incision  He notes all of his other wounds to be well healed  He denies any redness or pain  He still does note swelling about the knee         Review of Systems      Past Medical History:   Diagnosis Date    Anesthesia complication     difficulty waking up    Piercing     ears    Testicle pain     varicele       Past Surgical History:   Procedure Laterality Date    APPENDECTOMY      ORCHIOPEXY Bilateral 1/20/2018    Procedure: LEFT SCROTAL EXPLORATION, BILATERAL ORCHIOPEXY;  Surgeon: Dustin Borrego MD;  Location: [address] [place] [address];  Service: Urology    NJ EXCISE VARICOCELE Left 2/14/2019    Procedure: VARICOCELECTOMY;  Surgeon: Dustin Borrego MD;  Location: [address] [place] [address];  Service: Urology    NJ KNEE SCOPE,AID ANT CRUCIATE REPAIR Right 6/18/2020    Procedure: ARTHROSCOPIC RECONSTRUCTION ANTERIOR CRUCIATE LIGAMENT (ACL) WITH QUADRICEPS TENDON AUTOGRAFT;  Surgeon: Saul Emanuel MD;  Location: [facility];  Service: Orthopedics    TONSILLECTOMY         Family History   Problem Relation Age of Onset    No Known Problems Mother     Hypertension Father     Glaucoma Father     Asthma Brother     Asthma Brother        Social History     Occupational History    Not on file   Tobacco Use    Smoking status: Current Some Day Smoker     Types: E-Cigarettes    Smokeless tobacco: Current User   Substance and Sexual Activity    Alcohol use: Yes     Comment: occ    Drug use: No    Sexual activity: Yes     Partners: Female     Birth control/protection: Condom Male         Current Outpatient Medications:     acetaminophen (TYLENOL) 650 mg CR tablet, Take 1 tablet (650 mg total) by mouth every 8 (eight) hours as needed for mild pain, Disp: 30 tablet, Rfl: 0    fluticasone (FLONASE) 50 mcg/act nasal spray, 1 spray into each nostril daily (Patient not taking: Reported on 5/27/2020), Disp: 16 g, Rfl: 0    ibuprofen (MOTRIN) 600 mg tablet, Take 1 tablet (600 mg total) by mouth every 6 (six) hours as needed for moderate pain, Disp: 30 tablet, Rfl: 0    naproxen (EC NAPROSYN) 500 MG EC tablet, Take 1 tablet (500 mg total) by mouth 2 (two) times a day with meals (Patient not taking: Reported on 5/27/2020), Disp: 20 tablet, Rfl: 0    oxyCODONE-acetaminophen (PERCOCET) 5-325 mg per tablet, Take 1 tablet by mouth every 4 (four) hours as needed for moderate pain for up to 15 dosesMax Daily Amount: 6 tablets, Disp: 15 tablet, Rfl: 0    Allergies   Allergen Reactions    Penicillins Hives       Objective:  Vitals:    07/14/20 1052   BP: 127/78   Pulse: 75   Temp: 98 °F (36 7 °C)       Ortho Exam    Physical Exam    Right knee: Small spitting stitch about the central aspect of his quadriceps tendon harvest incision  No surrounding erythema  Pin sized hole left after this suture was removed  No drainage appreciated  No tenderness   1+ effusion

## 2020-07-16 ENCOUNTER — OFFICE VISIT (OUTPATIENT)
Dept: PHYSICAL THERAPY | Facility: CLINIC | Age: 20
End: 2020-07-16
Payer: COMMERCIAL

## 2020-07-16 DIAGNOSIS — M62.559 ATROPHY OF QUADRICEPS FEMORIS MUSCLE: ICD-10-CM

## 2020-07-16 DIAGNOSIS — S83.511D RUPTURE OF ANTERIOR CRUCIATE LIGAMENT OF RIGHT KNEE, SUBSEQUENT ENCOUNTER: Primary | ICD-10-CM

## 2020-07-16 DIAGNOSIS — M25.561 ACUTE PAIN OF RIGHT KNEE: ICD-10-CM

## 2020-07-16 PROCEDURE — 97110 THERAPEUTIC EXERCISES: CPT

## 2020-07-16 PROCEDURE — 97112 NEUROMUSCULAR REEDUCATION: CPT

## 2020-07-16 NOTE — PROGRESS NOTES
Daily Note     Today's date: 2020  Patient name: Gerber Sabillon  : 2000  MRN: [id number]  Referring provider: Lizbeth Leyva  Dx:   Encounter Diagnosis     ICD-10-CM    1  Rupture of anterior cruciate ligament of right knee, subsequent encounter S83 511D    2  Acute pain of right knee M25 561    3  Atrophy of quadriceps femoris muscle M62 559        Start Time: 0800  Stop Time: 0502  Total time in clinic (min): 55 minutes    Subjective: Patient denies pain at this time  Patient called MD Tuesday due to exposed stitch in R incision  Stitch was partially removed  No signs of drainage, infection, or scarring at this time  Objective: See treatment diary below      Assessment: Tolerated treatment well  Patient would benefit from continued PT to decrease R knee swelling, improve R quad activation, and improve overall functional mobility  Patient able to ambulate with brace unlocked at 30 deg R knee flexion  Patient cued to perform proper heel strike when ambulating  Due to lack of R quad strength at this time, patient has difficulties with performing heel strike on R LE  Pt unable to perform TKE due to quad atrophy and weakness at this time  Patient was educated on the importance of performing quad sets and SAQ exercises to achieve TKE  Brace to be unlocked when pt can demonstrate SLR without quad lag  Continue to progress patient as able  Plan: Continue per plan of care        Precautions: Standard, ACL reconstruction 2020      Manuals  7/ 7 7/ 7/10 7/13 7/16    R patellar mobs  AR  AR AR AR   AR      R knee PROM  AR  AR  AR  AR AR      STM ITB, quad   AR AR AR  AR  AR      Retrograde massage       AR AR        R knee ext    0 deg 0 deg 0 deg 0 deg 0 deg 0 deg 0 deg 0 deg    R knee flex    70 deg 87 deg 90 deg 95 deg 110 deg 112 deg 115 deg 115 deg    Neuro Re-Ed               Quad Sets   5s x 10 5s x 10 5s x 10 5s x 10  5s x 10 with NMES (10') 5s x 10 with NMES 5s x 20 5s x 20 5sx20    Clamshells          2 x 10 GTB     Bridge          Hold until no quad lag      +on ball               +SL               +with HS curl               + hip hike                              Ther Ex               Bike warmup    5' 5' 5' 5' 5' 5' 5' 5'    SLR flex, abd, add  2x10 2 x 10 2 x 10 AAROM 2 x 10  3 x 10 3 x 10 3 x 10 3 x 10 3 x 10 3 x 10    Gastroc stretch, HS stretch  3 x 30s 3 x 30s 3 x 30s 3x 30s 3 x 30s 3 x 30s    3 x 30s    Heel slides    2 x 10 2 x 10 2 x 10 2 x 10 2 x 10 2 x 10 2 x 10 3x10 2 x10    Ball squats, wall squats                Calf raises    2 x 10 2 x 10 2x10 2 x 10 2 x 10 2 x 10 2 x 10 2 x 10 2 x 10    Mini squats     2 x 10 2 x 10 2 x 10 3 x 10 3 x 10 80deg 3 x 10 80 deg 3 x 20   90 deg 3 x 10 90 deg    Step ups        Next visit x10 no risers 2x10 no risers 2 x 10 1 riser    TKE         Next visit 3s x 10 3s x 10    Ball toss SL balance               Standing marches         2 x 10 2 x 10 2 x 10    Prone HS curls         2 x 10 2 x 10 2 x 10     Lateral step overs           2 x 10 2 x 10    Pro stretch           10s x 10    Peanut knee flex         2 x 10      SAQ           3s x 10    Prone hang   2 x 1'  2 x 1' 2 x 1'  1 x 1'       Ther Activity                                             Gait Training               Stair training  Rev Rev             Ambulation with and without crutches, unlocked brace, uneven terrain      Rev Rev  Rev  10' in and around clinic    Squat training Rev  Rev             Modalities               Ice post   10' 10' 10' 10' 10' 10' 10' 10' 10' 10'

## 2020-07-17 ENCOUNTER — EVALUATION (OUTPATIENT)
Dept: PHYSICAL THERAPY | Facility: CLINIC | Age: 20
End: 2020-07-17
Payer: COMMERCIAL

## 2020-07-17 DIAGNOSIS — S83.511D RUPTURE OF ANTERIOR CRUCIATE LIGAMENT OF RIGHT KNEE, SUBSEQUENT ENCOUNTER: Primary | ICD-10-CM

## 2020-07-17 DIAGNOSIS — M25.561 ACUTE PAIN OF RIGHT KNEE: ICD-10-CM

## 2020-07-17 DIAGNOSIS — M62.559 ATROPHY OF QUADRICEPS FEMORIS MUSCLE: ICD-10-CM

## 2020-07-17 PROCEDURE — 97112 NEUROMUSCULAR REEDUCATION: CPT

## 2020-07-17 PROCEDURE — 97110 THERAPEUTIC EXERCISES: CPT

## 2020-07-17 NOTE — PROGRESS NOTES
PT Re-Evaluation     Today's date: 2020  Patient name: Ady Thomas  : 2000  MRN: [id number]  Referring provider: Nathalie Bucio  Dx:   Encounter Diagnosis     ICD-10-CM    1  Rupture of anterior cruciate ligament of right knee, subsequent encounter S83 511D    2  Acute pain of right knee M25 561    3  Atrophy of quadriceps femoris muscle M62 559        Start Time: 0800  Stop Time: 2173  Total time in clinic (min): 50 minutes    Assessment  Assessment details: Ady Thomas is a 21y o  year old male who reports to skilled PT intervention S/P R ACL reconstruction  Functionally, Ivory Echeverria is limited in the following activities: riding dirt bikes, 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  Patient goal(s) for physical therapy is: return to dirt biking and working with no problems  Impairments:   ROM: decreased R knee flexion  Strength: decreased R quad strength, R quad atrophy, decreased R hip strength globally  + TTP and increased soft tissue density: R quad  Reduced flexibility: decreased R HS flexibility, decreased gastroc flexibility   Gait abnormalities: ambulates with 0 deg locked brace due to R quad lag  Functional mobility: limited ambulation speed, distance, and difficulties with stair navigation     Patient demonstrates significant R quad atrophy as compared to L  NMES unit to be distributed to patient to help restore quad activation  Patient's clinical presentation is consistent with their referring diagnosis of: Rupture of anterior cruciate ligament of right knee, subsequent encounter  Plan: Ambulatory referral to Physical Therapy  PLOC was discussed and agreed upon with patient  Patient was educated on: DVT prevention, DVT signs/symptoms, HEP as noted on flow sheet, CPM, NMES, and protections of graft  Patient vocalized a good understanding of  PLOC and HEP issued   Patient would benefit from skilled physical therapy services to address their aforementioned functional limitations and progress towards prior level of function and independence with home exercise program   FOTO score is 49% with a 51% prediction in function  Patient tolerated exercises and progressions well  Patient lacks R quad strength as compared to the L  Atrophy noted on R quad  Patient requires verbal cueing to activate R quad during all standing and supine exercises  Continue to progress patient as able  Goals  STGs to be achieved in 3 weeks  STG 1: Patient will be independent with HEP - MET  STG 2: Patient will have decreased R knee pain to less than 2/10 in order to sleep through the night -MET  STG 3: Patient will ambulate with least restrictive device and less than 2/10 pain -PARTIALLY MET  STG 4: Patient will be able to perform self care independently - MET    LTGs to be achieved in 8 weeks  LTG 1: Patient will improve R knee ROM by 75% in order to be functionally independent -MET  LTG 2: Patient will have improved strength to greater than 4/5 in order to return being independent in the community -PARTIALLY MET  LTG 3: Patient will demonstrate improved quad activate during all LE exercises in order to ambulate independently - PARTIALLY MET  LTG 4: Patient will Have greater than 120 degrees of R knee flexion to be able to ride dirt bike - PARTIALLY MET  LTG 5: Patient will ambulate with no gait deviations and no pain in order to return to work -NOT MET      Plan  Patient would benefit from: PT eval and skilled physical therapy  Planned modality interventions: cryotherapy, manual electrical stimulation, TENS, traction, ultrasound and electrical stimulation/Russian stimulation  Planned therapy interventions: IADL retraining, joint mobilization, manual therapy, massage, motor coordination training and neuromuscular re-education  Frequency: 3x week  Treatment plan discussed with: patient        Subjective Evaluation    History of Present Illness  Date of surgery: 6/18/2020  Mechanism of injury: Pt is 4 week post op R ACL reconstruction  Pt denies pain at this time  He ambulates with locked brace due to R quad lag and atrophy of R quad  Pt has difficulties with ascending/descending stairs due to locked brace and muscle soreness  Pain  Current pain ratin  At best pain ratin  At worst pain ratin  Aggravating factors: standing and stair climbing    Social Support  Steps to enter house: yes  4  Stairs in house: yes   12  Lives in: multiple-level home    Patient Goals  Patient goals for therapy: decreased pain, return to sport/leisure activities, return to work, increased motion, improved balance and increased strength          Objective     Static Posture     Comments  A/PROM  L knee AROM-Flexion: 130 degrees    L knee AROM-Extension: 0 degrees     R knee AROM-Flexion: 120 degrees   R knee AROM-Extension: 0 degrees     L knee PROM-Flexion: 130 degrees    L knee PROM-Extension: 0 degrees     R knee PROM-Flexion: 120 degrees   R knee PROM-Extension: 0 degrees         (PALPATION):  Palpation to R knee:  Tenderness over R ITB and R quad, limited R patella mobility      (+) swelling in R knee     (+) R quad atrophy     Lower Quarter Screen: L LE 5/5  R Hip flexion 4/5   R Hip ext 4/5  R Hip abd 4/5  R Hip add 4/5  R Hip IR 4/5  R Hip ER 4/5     R Knee flex: 3/5  R Knee ext: 3/5    Ankle DF 5/5   Ankle PF 5/5  Ankle IV 5/5  Ankle EV 5/5     Sensation: intact in BLE to light touch    Special tests: no special tests were performed due to surgical intervention         Precautions: Standard, ACL reconstruction 2020      Manuals 6/19 6/22 6/24 6/26 6/30 7/2 7/7 7/9 7/10 7/13 7/16 7/17   R patellar mobs  AR  AR AR AR   AR      R knee PROM  AR  AR  AR  AR AR      STM ITB, quad   AR AR AR  AR  AR      Retrograde massage       AR AR        R knee ext    0 deg 0 deg 0 deg 0 deg 0 deg 0 deg 0 deg 0 deg 0 deg   R knee flex    70 deg 87 deg 90 deg 95 deg 110 deg 112 deg 115 deg 115 deg 120 deg   Neuro Re-Ed Quad Sets   5s x 10 5s x 10 5s x 10 5s x 10  5s x 10 with NMES (10') 5s x 10 with NMES 5s x 20 5s x 20 5sx20 5s x 20   Clamshells          2 x 10 GTB     Bridge          Hold until no quad lag      +on ball               +SL               +with HS curl               + hip hike                              Ther Ex               Bike warmup    5' 5' 5' 5' 5' 5' 5' 5' 5'   SLR flex, abd, add  2x10 2 x 10 2 x 10 AAROM 2 x 10  3 x 10 3 x 10 3 x 10 3 x 10 3 x 10 3 x 10 3 x 10   Gastroc stretch, HS stretch  3 x 30s 3 x 30s 3 x 30s 3x 30s 3 x 30s 3 x 30s    3 x 30s    Heel slides    2 x 10 2 x 10 2 x 10 2 x 10 2 x 10 2 x 10 2 x 10 3x10 2 x10    Ball squats, wall squats                Calf raises    2 x 10 2 x 10 2x10 2 x 10 2 x 10 2 x 10 2 x 10 2 x 10 2 x 10 2 x 10   Mini squats     2 x 10 2 x 10 2 x 10 3 x 10 3 x 10 80deg 3 x 10 80 deg 3 x 20   90 deg 3 x 10 90 deg 3 x 10 90 deg   Step ups        Next visit x10 no risers 2x10 no risers 2 x 10 1 riser 2 x 10 1 riser   TKE         Next visit 3s x 10 3s x 10 3s x 10   Ball toss SL balance            2 x 10   Standing marches         2 x 10 2 x 10 2 x 10 2 x 10   Prone HS curls         2 x 10 2 x 10 2 x 10     Lateral step overs           2 x 10 2 x 10 2 x 10   Pro stretch           10s x 10 10s x 10   Peanut knee flex         2 x 10      SAQ           3s x 10 3s x 10   Prone hang   2 x 1'  2 x 1' 2 x 1'  1 x 1'       Ther Activity                                             Gait Training               Stair training  Rev Rev             Ambulation with and without crutches, unlocked brace, uneven terrain      Rev Rev  Rev  10' in and around clinic 10' in and around clinic   Squat training Rev  Rev             Modalities               Ice post   10' 10' 10' 10' 10' 10' 10' 10' 10' 10' 10'

## 2020-07-21 ENCOUNTER — OFFICE VISIT (OUTPATIENT)
Dept: PHYSICAL THERAPY | Facility: CLINIC | Age: 20
End: 2020-07-21
Payer: COMMERCIAL

## 2020-07-21 DIAGNOSIS — M62.559 ATROPHY OF QUADRICEPS FEMORIS MUSCLE: ICD-10-CM

## 2020-07-21 DIAGNOSIS — M25.561 ACUTE PAIN OF RIGHT KNEE: ICD-10-CM

## 2020-07-21 DIAGNOSIS — S83.511D RUPTURE OF ANTERIOR CRUCIATE LIGAMENT OF RIGHT KNEE, SUBSEQUENT ENCOUNTER: Primary | ICD-10-CM

## 2020-07-21 PROCEDURE — 97110 THERAPEUTIC EXERCISES: CPT

## 2020-07-21 PROCEDURE — 97112 NEUROMUSCULAR REEDUCATION: CPT

## 2020-07-21 NOTE — PROGRESS NOTES
Daily Note     Today's date: 2020  Patient name: Mitzy nAtony  : 2000  MRN: [id number]  Referring provider: Yanci Ye  Dx:   Encounter Diagnosis     ICD-10-CM    1  Rupture of anterior cruciate ligament of right knee, subsequent encounter S83 511D    2  Acute pain of right knee M25 561    3  Atrophy of quadriceps femoris muscle M62 559        Start Time: 5210  Stop Time: 0845  Total time in clinic (min): 55 minutes    Subjective: Patient denies pain at this time  He complains of R knee stiffness and tightness at this time  Objective: See treatment diary below      Assessment: Tolerated treatment well  Patient would benefit from continued PT to improve R knee ROM and strength, and decrease overall pain to improve functional mobility  Patient demonstrates difficulties with performing stair training with unlocked brace at this time  Patient cued to activate R quad with retro stair navigation  Verbal cueing required for patient to perform heel strike and TKE during ambulation training in clinic  Patient demonstrates active SLR without quad lag at this time  Patient exhibits R quad atrophy at this time, but is able to perform SLR without quad lag  Unlock brace to 30 degrees knee flexion at this time  Continue to progress as able  Plan: Continue per plan of care        Precautions: Standard, ACL reconstruction 2020      Manuals 7/10 7/13 7/16 7/17 7/21      R patellar mobs AR          R knee PROM AR          STM ITB, quad, HS AR    5'      R knee ext 0 deg 0 deg 0 deg 0 deg 0 deg      R knee flex 112 deg 115 deg 115 deg 120 deg 120 deg      Neuro Re-Ed           Quad Sets  5s x 20 5s x 20 5sx20 5s x 20 5s x 20      SL balance on foam      10s x 3      Bridge  Hold          +on ball           +SL           +with HS curl           + hip hike                      Ther Ex           Bike warmup 5' 5' 5' 5' 5'      SLR flex, abd, add 3 x 10 3 x 10 3 x 10 3 x 10 3x10      Gastroc stretch, HS stretch   3 x 30s  3 x 30s      Heel slides  2 x 10 3x10 2 x10        Ball squats, wall squats            Calf raises  2 x 10 2 x 10 2 x 10 2 x 10 2x10      Mini squats  3 x 10 80 deg 3 x 20   90 deg 3 x 10 90 deg 3 x 10 90 deg 3 x 10   90 deg      Step ups for, retro x10 no risers 2x10 no risers 2 x 10 1 riser 2 x 10 1 riser 2 x 10 1 riser      TKE Next visit 3s x 10 3s x 10 3s x 10 5s x 10      Ball toss SL balance    2 x 10 2 x 10      Standing marches 2 x 10 2 x 10 2 x 10 2 x 10       Prone HS curls  2 x 10 2 x 10   2 x 10      Lateral step overs   2 x 10 2 x 10 2 x 10 2x10      Pro stretch   10s x 10 10s x 10 10s x 10      Pre gait      x10      Peanut knee flex 2 x 10    2 x 10      SAQ   3s x 10 3s x 10 3s x 10      Prone hang           Gait Training           Stair training            Ambulation with and without crutches, unlocked brace, uneven terrain Rev  10' in and around clinic 10' in and around clinic 5' in and around clinic      Squat training           Modalities           Ice post  10' 10' 10' 10' 10'

## 2020-07-23 ENCOUNTER — OFFICE VISIT (OUTPATIENT)
Dept: PHYSICAL THERAPY | Facility: CLINIC | Age: 20
End: 2020-07-23
Payer: COMMERCIAL

## 2020-07-23 DIAGNOSIS — S83.511D RUPTURE OF ANTERIOR CRUCIATE LIGAMENT OF RIGHT KNEE, SUBSEQUENT ENCOUNTER: Primary | ICD-10-CM

## 2020-07-23 DIAGNOSIS — M62.559 ATROPHY OF QUADRICEPS FEMORIS MUSCLE: ICD-10-CM

## 2020-07-23 DIAGNOSIS — M25.561 ACUTE PAIN OF RIGHT KNEE: ICD-10-CM

## 2020-07-23 PROCEDURE — 97112 NEUROMUSCULAR REEDUCATION: CPT

## 2020-07-23 PROCEDURE — 97110 THERAPEUTIC EXERCISES: CPT

## 2020-07-23 NOTE — PROGRESS NOTES
Daily Note     Today's date: 2020  Patient name: Billy Lott  : 2000  MRN: [id number]  Referring provider: Jonna Blanco  Dx:   Encounter Diagnosis     ICD-10-CM    1  Rupture of anterior cruciate ligament of right knee, subsequent encounter S83 511D    2  Acute pain of right knee M25 561    3  Atrophy of quadriceps femoris muscle M62 559        Start Time: 8101  Stop Time: 0845  Total time in clinic (min): 55 minutes    Subjective: Patient denies pain at this time  He feels as though his knee is still unstable at this time  Objective: See treatment diary below      Assessment: Tolerated treatment well  Patient would benefit from continued PT to improve R knee ROM, strength, and overall functional mobility  Patient tolerated progressions well today  Decreased flexibility of R HS and R quad noted  Stretch throughout treatment session  Patient provided with HEP to improve LE strength and ROM  Patient demonstrated and verbalized understanding of all exercises  Continue to progress as able  Plan: Continue per plan of care        Precautions: Standard, ACL reconstruction 2020      Manuals 7/10 7/13 7/16 7/17 7/21 7/23     R patellar mobs AR          R knee PROM AR          STM ITB, quad, HS AR    5'      R knee ext 0 deg 0 deg 0 deg 0 deg 0 deg 0 deg     R knee flex 112 deg 115 deg 115 deg 120 deg 120 deg 122 deg     Neuro Re-Ed           Quad Sets  5s x 20 5s x 20 5sx20 5s x 20 5s x 20 5s x 20     SL balance on foam      10s x 3 10sx3     Bridge  Hold     2 x 10     +on ball           +SL           +with HS curl           + hip hike                      Ther Ex           Bike warmup 5' 5' 5' 5' 5' 10'     SLR flex, abd, add 3 x 10 3 x 10 3 x 10 3 x 10 3x10 3 x10     Gastroc stretch, HS stretch   3 x 30s  3 x 30s 3 x 30s     Heel slides  2 x 10 3x10 2 x10        Bosu lunge, gentle      2 x 10     Calf raises  2 x 10 2 x 10 2 x 10 2 x 10 2x10 3x10     Mini squats  3 x 10 80 deg 3 x 20   90 deg 3 x 10 90 deg 3 x 10 90 deg 3 x 10   90 deg 3 x 10 90 deg     Step ups for, retro x10 no risers 2x10 no risers 2 x 10 1 riser 2 x 10 1 riser 2 x 10 1 riser 2x10 2 risers     TKE Next visit 3s x 10 3s x 10 3s x 10 5s x 10 5sx10     Ball toss SL balance    2 x 10 2 x 10 2x10     Standing marches 2 x 10 2 x 10 2 x 10 2 x 10       Prone HS curls  2 x 10 2 x 10   2 x 10 2 x 10     Lateral step overs   2 x 10 2 x 10 2 x 10 2x10 2 x 10     Pro stretch   10s x 10 10s x 10 10s x 10 10sx10     Pre gait      x10      Peanut knee flex 2 x 10    2 x 10      SAQ   3s x 10 3s x 10 3s x 10 5s x 20     Gait Training           Stair training            Ambulation with and without crutches, unlocked brace, uneven terrain Rev  10' in and around clinic 10' in and around clinic 5' in and around clinic 5' in and around clinic     Squat training           Modalities           Ice post  10' 10' 10' 10' 10' 10'

## 2020-07-24 ENCOUNTER — OFFICE VISIT (OUTPATIENT)
Dept: PHYSICAL THERAPY | Facility: CLINIC | Age: 20
End: 2020-07-24
Payer: COMMERCIAL

## 2020-07-24 DIAGNOSIS — M25.561 ACUTE PAIN OF RIGHT KNEE: ICD-10-CM

## 2020-07-24 DIAGNOSIS — S83.511D RUPTURE OF ANTERIOR CRUCIATE LIGAMENT OF RIGHT KNEE, SUBSEQUENT ENCOUNTER: Primary | ICD-10-CM

## 2020-07-24 DIAGNOSIS — M62.559 ATROPHY OF QUADRICEPS FEMORIS MUSCLE: ICD-10-CM

## 2020-07-24 PROCEDURE — 97110 THERAPEUTIC EXERCISES: CPT

## 2020-07-24 PROCEDURE — 97112 NEUROMUSCULAR REEDUCATION: CPT

## 2020-07-24 NOTE — PROGRESS NOTES
Daily Note     Today's date: 2020  Patient name: Teodora Ng  : 2000  MRN: [id number]  Referring provider: Zain Wilkerson  Dx:   Encounter Diagnosis     ICD-10-CM    1  Rupture of anterior cruciate ligament of right knee, subsequent encounter S83 511D    2  Acute pain of right knee M25 561    3  Atrophy of quadriceps femoris muscle M62 559        Start Time: 0800  Stop Time: 225  Total time in clinic (min): 50 minutes    Subjective: Patient reports he is having minimal difficulties perform self care at this time  He is able to stand on R LE without losing balance when he is dressing in the morning  Ascending and descending stairs are much easier for him at this time  Objective: See treatment diary below      Assessment: Patient codi to perform full revolution on upright bike at this time with minimal soreness or tightness noted  Single limb balance on even surface tolerated well  Progression to foam caused increased sway  SLR performed independently with no quad lag at this time  Tolerated treatment well  Patient would benefit from continued PT to improve R LE strength and ROM and improve overall functional mobility  Plan: Continue per plan of care        Precautions: Standard, ACL reconstruction 2020      Manuals 7/10 7/13 7/16 7/17 7/21 7/23 7/24    R patellar mobs AR          R knee PROM AR          STM ITB, quad, HS AR    5'      R knee ext 0 deg 0 deg 0 deg 0 deg 0 deg 0 deg 0deg    R knee flex 112 deg 115 deg 115 deg 120 deg 120 deg 122 deg 123 deg    Neuro Re-Ed           Quad Sets  5s x 20 5s x 20 5sx20 5s x 20 5s x 20 5s x 20 5s x 20    SL balance on foam      10s x 3 10sx3 20s x 3    Bridge  Hold     2 x 10 2 x 10    +on ball           +SL           +with HS curl           + hip hike                      Ther Ex           Bike warmup 5' 5' 5' 5' 5' 10' 5'     SLR flex, abd, add 3 x 10 3 x 10 3 x 10 3 x 10 3x10 3 x10     Gastroc stretch, HS stretch   3 x 30s  3 x 30s 3 x 30s 3 x 30s    Heel slides  2 x 10 3x10 2 x10    2x10    Bosu lunge, gentle      2 x 10 2x10    Calf raises  2 x 10 2 x 10 2 x 10 2 x 10 2x10 3x10 3 x 10     Mini squats  3 x 10 80 deg 3 x 20   90 deg 3 x 10 90 deg 3 x 10 90 deg 3 x 10   90 deg 3 x 10 90 deg 3 x 10 90 deg    Step ups for, retro, lat x10 no risers 2x10 no risers 2 x 10 1 riser 2 x 10 1 riser 2 x 10 1 riser 2x10 2 risers 2 x 10 2 risers    TKE Next visit 3s x 10 3s x 10 3s x 10 5s x 10 5sx10     Ball toss SL balance    2 x 10 2 x 10 2x10 2x10    Sit to stands, low chair       x10    Prone HS curls  2 x 10 2 x 10   2 x 10 2 x 10 2x10    Lateral step overs   2 x 10 2 x 10 2 x 10 2x10 2 x 10 2 x 10    Pro stretch   10s x 10 10s x 10 10s x 10 10sx10     Peanut knee flex 2 x 10    2 x 10      SAQ, LAQ   3s x 10 3s x 10 3s x 10 5s x 20 5s x 20, 2 x 10    Gait Training           Stair training            Ambulation with and without crutches, unlocked brace, uneven terrain Rev  10' in and around clinic 10' in and around clinic 5' in and around clinic 5' in and around clinic     Modalities           Ice post  10' 10' 10' 10' 10' 10' Def

## 2020-07-27 ENCOUNTER — TELEPHONE (OUTPATIENT)
Dept: OBGYN CLINIC | Facility: HOSPITAL | Age: 20
End: 2020-07-27

## 2020-07-27 NOTE — TELEPHONE ENCOUNTER
Kike He is calling to let us know that she will be faxing a letter of medical necessity to Dr Bhupendra Hanson for a CPM machine       [phone]

## 2020-07-28 ENCOUNTER — OFFICE VISIT (OUTPATIENT)
Dept: PHYSICAL THERAPY | Facility: CLINIC | Age: 20
End: 2020-07-28
Payer: COMMERCIAL

## 2020-07-28 DIAGNOSIS — S83.511D RUPTURE OF ANTERIOR CRUCIATE LIGAMENT OF RIGHT KNEE, SUBSEQUENT ENCOUNTER: Primary | ICD-10-CM

## 2020-07-28 DIAGNOSIS — M62.559 ATROPHY OF QUADRICEPS FEMORIS MUSCLE: ICD-10-CM

## 2020-07-28 DIAGNOSIS — M25.561 ACUTE PAIN OF RIGHT KNEE: ICD-10-CM

## 2020-07-28 PROCEDURE — 97112 NEUROMUSCULAR REEDUCATION: CPT

## 2020-07-28 PROCEDURE — 97110 THERAPEUTIC EXERCISES: CPT

## 2020-07-28 NOTE — PROGRESS NOTES
Daily Note     Today's date: 2020  Patient name: Kat Adams  : 2000  MRN: [id number]  Referring provider: Dl Arana  Dx:   Encounter Diagnosis     ICD-10-CM    1  Rupture of anterior cruciate ligament of right knee, subsequent encounter S83 511D    2  Acute pain of right knee M25 561    3  Atrophy of quadriceps femoris muscle M62 559        Start Time:   Stop Time:   Total time in clinic (min): 55 minutes    Subjective: Patient denies pain at this time  She is having increased stiffness in his R knee  Functionally, patient feels as though he is ascending/descnding the stairs much easier since the brace was unlocked  Objective: See treatment diary below      Assessment: Tolerated treatment well  Patient would benefit from continued PT to improve R knee ROM and strength, and improve overall functional mobility  Patient is able to perform full revolution on upright bike at this time with minimal pain in is R knee  Patient was educated on importance of stretching and performing HEP as provided  Stair navigation was reviewed for step over step pattern  Patient able to progress to next phase of protocol as per criteria and timeline  Plan: Continue per plan of care        Precautions: Standard, ACL reconstruction 2020      Manuals 7/10 7/13 7/16 7/17 7/21 7/23 7/24 7/28   R patellar mobs AR          R knee PROM AR          STM ITB, quad, HS AR    5'      R knee ext 0 deg 0 deg 0 deg 0 deg 0 deg 0 deg 0deg 0 deg   R knee flex 112 deg 115 deg 115 deg 120 deg 120 deg 122 deg 123 deg 123 deg   Neuro Re-Ed           Quad Sets  5s x 20 5s x 20 5sx20 5s x 20 5s x 20 5s x 20 5s x 20    SL balance on foam      10s x 3 10sx3 20s x 3 20s x 3   Bridge  Hold     2 x 10 2 x 10 2 x 10   +on ball           +SL           +with HS curl           + hip hike                      Ther Ex           Bike warmup 5' 5' 5' 5' 5' 10' 5'  5'   SLR flex, abd, add 3 x 10 3 x 10 3 x 10 3 x 10 3x10 3 x10     Monster walks        3x10' GTB   Gastroc stretch, HS stretch   3 x 30s  3 x 30s 3 x 30s 3 x 30s 3 x 30s   Heel slides  2 x 10 3x10 2 x10    2x10    Bosu lunge, gentle      2 x 10 2x10 2 x 10   Calf raises  2 x 10 2 x 10 2 x 10 2 x 10 2x10 3x10 3 x 10  x20 with add squeeze, eccentric focus   Mini squats  3 x 10 80 deg 3 x 20   90 deg 3 x 10 90 deg 3 x 10 90 deg 3 x 10   90 deg 3 x 10 90 deg 3 x 10 90 deg 3 x 10 90 deg   Step ups for, lat and retro added 7/28 x10 no risers 2x10 no risers 2 x 10 1 riser 2 x 10 1 riser 2 x 10 1 riser 2x10 2 risers 2 x 10 2 risers 2 x 10 3 risers   TKE Next visit 3s x 10 3s x 10 3s x 10 5s x 10 5sx10  5s x 10   Ball toss SL balance    2 x 10 2 x 10 2x10 2x10 2 x 10   Sit to stands, low chair       x10 x10   Prone HS curls  2 x 10 2 x 10   2 x 10 2 x 10 2x10 2 x 10   Lateral step overs   2 x 10 2 x 10 2 x 10 2x10 2 x 10 2 x 10 2x10   Pro stretch   10s x 10 10s x 10 10s x 10 10sx10  10sx10   Peanut knee flex 2 x 10    2 x 10      SAQ, LAQ   3s x 10 3s x 10 3s x 10 5s x 20 5s x 20, 2 x 10 5s x 20, 2 x 10   Gait Training           Stair training         5' step over step descending stairs    Ambulation with and without crutches, unlocked brace, uneven terrain Rev  10' in and around clinic 10' in and around clinic 5' in and around clinic 5' in and around clinic     Modalities           Ice post  10' 10' 10' 10' 10' 10' Def

## 2020-07-30 ENCOUNTER — OFFICE VISIT (OUTPATIENT)
Dept: PHYSICAL THERAPY | Facility: CLINIC | Age: 20
End: 2020-07-30
Payer: COMMERCIAL

## 2020-07-30 DIAGNOSIS — M25.561 ACUTE PAIN OF RIGHT KNEE: ICD-10-CM

## 2020-07-30 DIAGNOSIS — S83.511D RUPTURE OF ANTERIOR CRUCIATE LIGAMENT OF RIGHT KNEE, SUBSEQUENT ENCOUNTER: Primary | ICD-10-CM

## 2020-07-30 DIAGNOSIS — M62.559 ATROPHY OF QUADRICEPS FEMORIS MUSCLE: ICD-10-CM

## 2020-07-30 PROCEDURE — 97110 THERAPEUTIC EXERCISES: CPT

## 2020-07-30 PROCEDURE — 97112 NEUROMUSCULAR REEDUCATION: CPT

## 2020-07-30 NOTE — PROGRESS NOTES
Daily Note     Today's date: 2020  Patient name: Modesta France  : 2000  MRN: [id number]  Referring provider: Candice Maria  Dx:   Encounter Diagnosis     ICD-10-CM    1  Rupture of anterior cruciate ligament of right knee, subsequent encounter S83 511D    2  Acute pain of right knee M25 561    3  Atrophy of quadriceps femoris muscle M62 559        Start Time: 0800  Stop Time: 6695  Total time in clinic (min): 50 minutes    Subjective: Patient denies pain in R knee at this time  He complains of increased stiffness in R knee during ambulation and stairs  Patient denies difficulties with ambulation around neighborhood 1 5 miles  Objective: See treatment diary below      Assessment: Tolerated treatment well  Patient would benefit from continued PT to improve R knee ROM and R knee strength to improve overall functional mobility in home and around community  Patient lacks R TKE when ambulating without brace around clinic  Patient cued to decrease gait speed to perform R heel strike and full R knee extension  Patient demonstrates loss of balance when performing R SL balance activities  Progress patient as able  Plan: Continue per plan of care        Precautions: Standard, ACL reconstruction 2020      Manuals 7/10 7/13 7/16 7/17 7/21 7/23 7/24 7/28 7/30   R patellar mobs AR           R knee PROM AR           STM ITB, quad, HS AR    5'       R knee ext 0 deg 0 deg 0 deg 0 deg 0 deg 0 deg 0deg 0 deg 0 deg   R knee flex 112 deg 115 deg 115 deg 120 deg 120 deg 122 deg 123 deg 123 deg 125 deg   Neuro Re-Ed            Quad Sets  5s x 20 5s x 20 5sx20 5s x 20 5s x 20 5s x 20 5s x 20     SL balance on foam      10s x 3 10sx3 20s x 3 20s x 3 20s x 3   Bridge  Hold     2 x 10 2 x 10 2 x 10    +on ball            +SL            +with HS curl            + hip hike                        Ther Ex            Bike warmup 5' 5' 5' 5' 5' 10' 5'  5' 5'   SLR flex, abd, add 3 x 10 3 x 10 3 x 10 3 x 10 3x10 3 x10   2x10, AROM, no brace   Monster walks        3x10' GTB    Prone quad stretch          3x20s   Gastroc stretch, HS stretch   3 x 30s  3 x 30s 3 x 30s 3 x 30s 3 x 30s 3 x 30s   Heel slides  2 x 10 3x10 2 x10    2x10     Bosu lunge, gentle      2 x 10 2x10 2 x 10 2 x 10, add lat   Calf raises  2 x 10 2 x 10 2 x 10 2 x 10 2x10 3x10 3 x 10  x20 with add squeeze, eccentric focus    Mini squats  3 x 10 80 deg 3 x 20   90 deg 3 x 10 90 deg 3 x 10 90 deg 3 x 10   90 deg 3 x 10 90 deg 3 x 10 90 deg 3 x 10 90 deg 3 x 10 100 deg   Step ups for, lat and retro added 7/28 x10 no risers 2x10 no risers 2 x 10 1 riser 2 x 10 1 riser 2 x 10 1 riser 2x10 2 risers 2 x 10 2 risers 2 x 10 3 risers 2 x 10 2 risers, no brace   TKE Next visit 3s x 10 3s x 10 3s x 10 5s x 10 5sx10  5s x 10    Ball toss SL balance    2 x 10 2 x 10 2x10 2x10 2 x 10    Sit to stands, low chair       x10 x10    Prone HS curls  2 x 10 2 x 10   2 x 10 2 x 10 2x10 2 x 10    Pro stretch   10s x 10 10s x 10 10s x 10 10sx10  10sx10 10s x 10   [address] dolores         x5   Single, double leg wall slides          x10   Star taps          x5   RDL         X20, 18#   SAQ, LAQ   3s x 10 3s x 10 3s x 10 5s x 20 5s x 20, 2 x 10 5s x 20, 2 x 10 5s x 20, 2 x 10   Gait Training            Stair training         5' step over step descending stairs  5' step over step descending stairs    Ambulation with and without crutches, unlocked brace, uneven terrain Rev  10' in and around clinic 10' in and around clinic 5' in and around clinic 5' in and around clinic   5' amb without brace   Modalities            Ice post  10' 10' 10' 10' 10' 10' Def  10'

## 2020-07-31 ENCOUNTER — OFFICE VISIT (OUTPATIENT)
Dept: PHYSICAL THERAPY | Facility: CLINIC | Age: 20
End: 2020-07-31
Payer: COMMERCIAL

## 2020-07-31 DIAGNOSIS — S83.511D RUPTURE OF ANTERIOR CRUCIATE LIGAMENT OF RIGHT KNEE, SUBSEQUENT ENCOUNTER: Primary | ICD-10-CM

## 2020-07-31 DIAGNOSIS — M25.561 ACUTE PAIN OF RIGHT KNEE: ICD-10-CM

## 2020-07-31 DIAGNOSIS — M62.559 ATROPHY OF QUADRICEPS FEMORIS MUSCLE: ICD-10-CM

## 2020-07-31 PROCEDURE — 97112 NEUROMUSCULAR REEDUCATION: CPT

## 2020-07-31 PROCEDURE — 97110 THERAPEUTIC EXERCISES: CPT

## 2020-07-31 NOTE — PROGRESS NOTES
Daily Note     Today's date: 2020  Patient name: Buddy Flores  : 2000  MRN: [id number]  Referring provider: Juventino Jamison  Dx:   Encounter Diagnosis     ICD-10-CM    1  Rupture of anterior cruciate ligament of right knee, subsequent encounter S83 511D    2  Acute pain of right knee M25 561    3  Atrophy of quadriceps femoris muscle M62 559        Start Time: 0800  Stop Time: 6181  Total time in clinic (min): 50 minutes    Subjective: Patient denies R knee pain at this time  He complains of increased R knee stiffness when ambulating and descending stairs  Patient is going on camping trip this weekend and will not return to PT until Thursday  Objective: See treatment diary below      Assessment: Tolerated treatment well  Patient would benefit from continued PT to improve R knee ROM, R knee strength, and improve overall functional mobility  Patient was educated on importance of performing exercises while on camping trip  He was advised to stay away from paddle boating at this time due to lack of strength in R LE  Patient tolerated progressions well  Increased stiffness was noted in R knee while performing exercises in the beginning of the treatment session  Patient was able to ambulate with heel to toe gait pattern on TM with no increased R knee pain  Plan: Continue per plan of care        Precautions: Standard, ACL reconstruction 2020      Manuals       R patellar mobs          R knee PROM          STM ITB, quad, HS          R knee ext 0deg 0 deg 0 deg 0 deg      R knee flex 123 deg 123 deg 125 deg 125 deg      Neuro Re-Ed          Quad Sets  5s x 20         SL balance on foam  20s x 3 20s x 3 20s x 3       Bridge  2 x 10 2 x 10        +on ball          +SL          +with HS curl          + hip hike                    Ther Ex          Bike warmup 5'  5' 5' TM walk 8' 2 mph      SLR flex, abd, add   2x10, AROM, no brace       Monster walks  3x10' GTB  3x10' GTB      Prone quad stretch    3x20s 3 x 20s      Gastroc stretch, HS stretch 3 x 30s 3 x 30s 3 x 30s 3 x 30s      Heel slides  2x10         Bosu lunge, gentle 2x10 2 x 10 2 x 10, add lat 2x10 for, lat      Calf raises  3 x 10  x20 with add squeeze, eccentric focus        Mini squats  3 x 10 90 deg 3 x 10 90 deg 3 x 10 100 deg       Step ups for, lat and retro added 7/28 2 x 10 2 risers 2 x 10 3 risers 2 x 10 2 risers, no brace 2 x 10 2 risers, no brace      TKE  5s x 10  5s x 10      Ball toss SL balance 2x10 2 x 10        Sit to stands, low chair x10 x10        Heel taps     2 x 10 (1 riser)      Prone HS curls  2x10 2 x 10  2 x 10      Pro stretch  10sx10 10s x 10 10s x 10      [address] dolores   x5 x10      Single, double leg wall slides    x10 x10      Star taps    x5 x5      RDL   X20, 18# x20 18#      SAQ, LAQ 5s x 20, 2 x 10 5s x 20, 2 x 10 5s x 20, 2 x 10 5s x 20, 2 x 10      Gait Training          Stair training   5' step over step descending stairs  5' step over step descending stairs        Ambulation with and without crutches, unlocked brace, uneven terrain   5' amb without brace       Modalities          Ice post  Def  10' 5'

## 2020-08-06 ENCOUNTER — EVALUATION (OUTPATIENT)
Dept: PHYSICAL THERAPY | Facility: CLINIC | Age: 20
End: 2020-08-06
Payer: COMMERCIAL

## 2020-08-06 DIAGNOSIS — S83.511D RUPTURE OF ANTERIOR CRUCIATE LIGAMENT OF RIGHT KNEE, SUBSEQUENT ENCOUNTER: Primary | ICD-10-CM

## 2020-08-06 PROCEDURE — 97110 THERAPEUTIC EXERCISES: CPT

## 2020-08-06 PROCEDURE — 97112 NEUROMUSCULAR REEDUCATION: CPT

## 2020-08-06 NOTE — PROGRESS NOTES
PT Re-Evaluation     Today's date: 2020  Patient name: Saad Malone  : 2000  MRN: [id number]  Referring provider: Brenda Peralta  Dx:   Encounter Diagnosis     ICD-10-CM    1  Rupture of anterior cruciate ligament of right knee, subsequent encounter  S83 511D        Start Time: 0800  Stop Time: 0850  Total time in clinic (min): 50 minutes    Assessment  Assessment details: Saad Malone is a 21y o  year old male who reports to skilled PT intervention S/P R ACL reconstruction  Patient demonstrates improvements in R knee ROM, strength, and overall functional mobility  See impairments below for details  Patient tolerated exercises and progressions well  Patient lacks R quad strength as compared to the L  Atrophy noted on R quad  Patient requires verbal cueing to perform R TKE when ambulating without brace around clinic  Continue to progress patient as able  Functional impairments: walking greater than 1 mile, descending stairs, running, starting dirt bike, and standing greater than 1 hour  Impairments:    ROM: decreased R knee flexion   Strength: decreased R quad strength, R quad atrophy, decreased R hip strength globally   + TTP and increased soft tissue density: R quad   Reduced flexibility: decreased R HS flexibility, decreased gastroc flexibility    Gait abnormalities: ambulates with unlocked R LE brace to 100 deg, heel to toe gait pattern, lacks full R TKE without verbal cueing    Patient demonstrates significant R quad atrophy as compared to L  NMES unit to be distributed to patient to help restore quad activation  PLOC was discussed and agreed upon with patient  Patient was educated on: HEP as noted in flow sheet, importance of R quad strengthening and atrophy, and progression towards goals according to protocol  Patient vocalized a good understanding of  PLOC and HEP issued   Patient would benefit from skilled physical therapy services to address their aforementioned functional limitations and progress towards prior level of function and independence with home exercise program   FOTO score is 52% with a 51% prediction in function  Goals  STGs to be achieved in 3 weeks  STG 1: Patient will be independent with HEP - MET  STG 2: Patient will have decreased R knee pain to less than 2/10 in order to sleep through the night -MET  STG 3: Patient will ambulate with least restrictive device and less than 2/10 pain -PARTIALLY MET  STG 4: Patient will be able to perform self care independently - MET    LTGs to be achieved in 8 weeks  LTG 1: Patient will improve R knee ROM by 75% in order to be functionally independent -MET  LTG 2: Patient will have improved strength to greater than 4/5 in order to return being independent in the community -PARTIALLY MET  LTG 3: Patient will demonstrate improved quad activate during all LE exercises in order to ambulate independently - PARTIALLY MET  LTG 4: Patient will Have greater than 120 degrees of R knee flexion to be able to ride dirt bike - MET  LTG 5: Patient will ambulate with no gait deviations and no pain in order to return to work -NOT MET  LTG 6: Patient will be able to start dirt bike with R LE and no pain - NOT MET  LTG 7: Patient will be able to run greater than 1 mile with no R knee pain  - NOT MET  LTG 8: Patient able to stand for 8 hours on uneven terrain to return to work   - NOT  MET    Plan  Patient would benefit from: PT eval and skilled physical therapy  Planned modality interventions: cryotherapy, manual electrical stimulation, TENS, traction, ultrasound and electrical stimulation/Russian stimulation  Planned therapy interventions: IADL retraining, joint mobilization, manual therapy, massage, motor coordination training and neuromuscular re-education  Frequency: 3x week  Treatment plan discussed with: patient        Subjective Evaluation    History of Present Illness  Date of surgery: 6/18/2020  Mechanism of injury: Pt is 6 week post op R ACL reconstruction  Pt denies pain at this time  He is ambulating with ease at home and in the community  Brace is unlocked to 100 deg R knee flexion at this time  Stair navigation is easier, but pt complains of difficulties with descending stairs  Patient wishes to ride dirt bike soon     Pain  Current pain ratin  At best pain ratin  At worst pain ratin  Quality: sharp  Aggravating factors: standing and stair climbing    Social Support  Steps to enter house: yes  4  Stairs in house: yes   12  Lives in: multiple-level home    Patient Goals  Patient goals for therapy: decreased pain, return to sport/leisure activities, return to work, increased motion, improved balance and increased strength  Patient goal: Ride dirt bike, run greater than 1 mile        Objective     General Comments:      Knee Comments  L knee A/PROM 0-130 deg   L knee A/PROM 0-125 deg     (PALPATION):  Palpation to R knee:  Tenderness over R ITB and R quad, limited R patella mobility      (+) swelling in R knee     (+) R quad atrophy     [place] Screen: L LE 5/5   R Hip flexion 5/5    R Hip ext 5/5   R Hip abd 5/5   R Hip add 5/5   R Hip IR 5/5   R Hip ER 5/5      R Knee flex: 4/5 (pain)   R Knee ext: 4/5     R Ankle DF 5/5    R Ankle PF 5/5   R Ankle IV 5/5   R Ankle EV 5/5     Sensation: intact in BLE to light touch    Special tests: no special tests were performed due to surgical intervention         Precautions: Standard, ACL reconstruction 2020    Manuals  8/6     R patellar mobs          R knee PROM          STM ITB, quad, HS          R knee ext AROM 0deg 0 deg 0 deg 0 deg 0 deg      R knee flex AROM 123 deg 123 deg 125 deg 125 deg 125 deg     Neuro Re-Ed          Quad Sets  5s x 20         SL balance on foam  20s x 3 20s x 3 20s x 3  3 x 30s     Bridge  2 x 10 2 x 10        +on ball          +SL          +with HS curl                    Ther Ex          Bike warmup 5'  5' 5' TM walk 8' 2 mph 5' L2     SLR flex, abd, add   2x10, AROM, no brace       Monster walks  3x10' GTB  3x10' GTB 3 x 10' GTB     Prone quad stretch    3x20s 3 x 20s 3 x 20s     Gastroc stretch, HS stretch 3 x 30s 3 x 30s 3 x 30s 3 x 30s 3 x 30s     Heel slides  2x10         Bosu lunge, gentle 2x10 2 x 10 2 x 10, add lat 2x10 for, lat 2 x 10 for, lat     Calf raises  3 x 10  x20 with add squeeze, eccentric focus   x20     Mini squats  3 x 10 90 deg 3 x 10 90 deg 3 x 10 100 deg  3x10, no brace      Step ups for, lat and retro added 7/28 2 x 10 2 risers 2 x 10 3 risers 2 x 10 2 risers, no brace 2 x 10 2 risers, no brace 2 x 10, 3 risers, no brace     TKE  5s x 10  5s x 10      Ball toss SL balance 2x10 2 x 10   2 x 10 on foam     Sit to stands, low chair x10 x10        Heel taps     2 x 10 (1 riser) 2 x 10 (1 riser)     Prone HS curls  2x10 2 x 10  2 x 10      Pro stretch  10sx10 10s x 10 10s x 10 10s x 10     [address] dolores walk over   x5 x10 x10     Single, double leg wall slides    x10 x10 x10     Star taps    x5 x5 x5     RDL   X20, 18# x20 18# x20 18#     SAQ, LAQ 5s x 20, 2 x 10 5s x 20, 2 x 10 5s x 20, 2 x 10 5s x 20, 2 x 10      Gait Training          Stair training   5' step over step descending stairs  5' step over step descending stairs        Ambulation with and without crutches, unlocked brace, uneven terrain   5' amb without brace  5' without brace, cue for heel to toe gait pattern      Modalities          Ice post  Def  10' 5'      Heat post      5'

## 2020-08-07 ENCOUNTER — OFFICE VISIT (OUTPATIENT)
Dept: PHYSICAL THERAPY | Facility: CLINIC | Age: 20
End: 2020-08-07
Payer: COMMERCIAL

## 2020-08-07 DIAGNOSIS — S83.511D RUPTURE OF ANTERIOR CRUCIATE LIGAMENT OF RIGHT KNEE, SUBSEQUENT ENCOUNTER: Primary | ICD-10-CM

## 2020-08-07 PROCEDURE — 97112 NEUROMUSCULAR REEDUCATION: CPT

## 2020-08-07 PROCEDURE — 97110 THERAPEUTIC EXERCISES: CPT

## 2020-08-07 NOTE — PROGRESS NOTES
Daily Note     Today's date: 2020  Patient name: Kathleen Oliveros  : 2000  MRN: [id number]  Referring provider: Saran Haley  Dx:   Encounter Diagnosis     ICD-10-CM    1  Rupture of anterior cruciate ligament of right knee, subsequent encounter  S83 511D        Start Time: 930  Stop Time: 1015  Total time in clinic (min): 45 minutes    Subjective: Patient missed follow-up appointment with MD this morning  Rescheduled for 8/10 at 11:30  He denies R knee pain at his time  He feels increased R stiffness in his knee when performing stairs  Objective: See treatment diary below      Assessment: Tolerated treatment well  Patient would benefit from continued PT to improve R knee ROM and strength, and improve overall functional mobility  Patient tolerated progressions well today  He demonstrates improvements in R quad strength  Atrophy noted in R quad  Adequate R quad activation at this time  Continue to progress as per protocol  Plan: Continue per plan of care        Precautions: Standard, ACL reconstruction 2020    Manuals     R patellar mobs          R knee PROM          STM ITB, quad, HS          R knee ext AROM 0deg 0 deg 0 deg 0 deg 0 deg  0 deg    R knee flex AROM 123 deg 123 deg 125 deg 125 deg 125 deg 125 deg    Neuro Re-Ed          Quad Sets  5s x 20     5x20s    SL balance on foam  20s x 3 20s x 3 20s x 3  3 x 30s     Bridge  2 x 10 2 x 10        +on ball          +SL          +with HS curl                    Ther Ex          Bike warmup 5'  5' 5' TM walk 8' 2 mph 5' L2 TM walk 5' 2 mph    SLR flex, abd, add   2x10, AROM, no brace       Monster walks  3x10' GTB  3x10' GTB 3 x 10' GTB 3 x 10' GTB    Prone quad stretch    3x20s 3 x 20s 3 x 20s 3 x 20s    Gastroc stretch, HS stretch 3 x 30s 3 x 30s 3 x 30s 3 x 30s 3 x 30s 3 x 30s    Heel slides  2x10     2 x 10    Bosu lunge, gentle 2x10 2 x 10 2 x 10, add lat 2x10 for, lat 2 x 10 for, lat 2 x 10 Calf raises  3 x 10  x20 with add squeeze, eccentric focus   x20 x20    Mini squats  3 x 10 90 deg 3 x 10 90 deg 3 x 10 100 deg  3x10, no brace  3 x 10, no brace    Step ups for, lat and retro added 7/28 2 x 10 2 risers 2 x 10 3 risers 2 x 10 2 risers, no brace 2 x 10 2 risers, no brace 2 x 10, 3 risers, no brace 2 x 10, 3 risers, no brace    TKE  5s x 10  5s x 10  5s x 10    Ball toss SL balance 2x10 2 x 10   2 x 10 on foam     Sit to stands, low chair x10 x10        Heel taps     2 x 10 (1 riser) 2 x 10 (1 riser)     Prone HS curls  2x10 2 x 10  2 x 10      Pro stretch  10sx10 10s x 10 10s x 10 10s x 10 10s x 10    [address] way dolores walk over   x5 x10 x10     Single, double leg wall slides    x10 x10 x10     Star taps    x5 x5 x5 x5    RDL   X20, 18# x20 18# x20 18# x20 18#    SAQ, LAQ 5s x 20, 2 x 10 5s x 20, 2 x 10 5s x 20, 2 x 10 5s x 20, 2 x 10  2 x 10    Gait Training          Stair training   5' step over step descending stairs  5' step over step descending stairs        Ambulation with and without crutches, unlocked brace, uneven terrain   5' amb without brace  5' without brace, cue for heel to toe gait pattern  5' without brace, cue for heel to toe gait pattern     Modalities          Ice post  Def  10' 5'      Heat post      5' 5'

## 2020-08-10 ENCOUNTER — OFFICE VISIT (OUTPATIENT)
Dept: OBGYN CLINIC | Facility: CLINIC | Age: 20
End: 2020-08-10

## 2020-08-10 DIAGNOSIS — Z98.890 S/P ACL RECONSTRUCTION: Primary | ICD-10-CM

## 2020-08-10 PROCEDURE — 99024 POSTOP FOLLOW-UP VISIT: CPT | Performed by: ORTHOPAEDIC SURGERY

## 2020-08-10 NOTE — PROGRESS NOTES
Assessment/Plan:  1  S/P ACL reconstruction         The patient is doing well and will discontinue use of his TROM brace at this point  He will continue physical therapy on the ACL reconstruction protocol  He will follow-up in 6 weeks for repeat evaluation  We did review his PT notes today  Subjective:   Modesta France is a 21 y o  male who presents today for follow-up of his right knee, status post ACL reconstruction with quadriceps tendon autograft performed almost 2 months ago  He still notes some tightness/sorness about his quadriceps tendon, but otherwise is doing well  He feels he is progressing well with PT and notes improving ROM and strength  He denies any swelling or instability  He has been ambulating with his TROM brace unlocked         Review of Systems      Past Medical History:   Diagnosis Date    Anesthesia complication     difficulty waking up    Piercing     ears    Testicle pain     varicele       Past Surgical History:   Procedure Laterality Date    APPENDECTOMY      ORCHIOPEXY Bilateral 1/20/2018    Procedure: LEFT SCROTAL EXPLORATION, BILATERAL ORCHIOPEXY;  Surgeon: Medardo Kirby MD;  Location: [address] [place] [address];  Service: Urology    MA EXCISE VARICOCELE Left 2/14/2019    Procedure: VARICOCELECTOMY;  Surgeon: Medardo Kirby MD;  Location: [facility];  Service: Urology    MA KNEE SCOPE,AID ANT CRUCIATE REPAIR Right 6/18/2020    Procedure: ARTHROSCOPIC RECONSTRUCTION ANTERIOR CRUCIATE LIGAMENT (ACL) WITH QUADRICEPS TENDON AUTOGRAFT;  Surgeon: Colette Franco MD;  Location: [facility];  Service: Orthopedics    TONSILLECTOMY         Family History   Problem Relation Age of Onset    No Known Problems Mother     Hypertension Father     Glaucoma Father     Asthma Brother     Asthma Brother        Social History     Occupational History    Not on file   Tobacco Use    Smoking status: Current Some Day Smoker     Types: E-Cigarettes    Smokeless tobacco: Current User   Substance and Sexual Activity    Alcohol use: Yes     Comment: occ    Drug use: No    Sexual activity: Yes     Partners: Female     Birth control/protection: Condom Male         Current Outpatient Medications:     acetaminophen (TYLENOL) 650 mg CR tablet, Take 1 tablet (650 mg total) by mouth every 8 (eight) hours as needed for mild pain, Disp: 30 tablet, Rfl: 0    fluticasone (FLONASE) 50 mcg/act nasal spray, 1 spray into each nostril daily (Patient not taking: Reported on 5/27/2020), Disp: 16 g, Rfl: 0    ibuprofen (MOTRIN) 600 mg tablet, Take 1 tablet (600 mg total) by mouth every 6 (six) hours as needed for moderate pain, Disp: 30 tablet, Rfl: 0    naproxen (EC NAPROSYN) 500 MG EC tablet, Take 1 tablet (500 mg total) by mouth 2 (two) times a day with meals (Patient not taking: Reported on 5/27/2020), Disp: 20 tablet, Rfl: 0    oxyCODONE-acetaminophen (PERCOCET) 5-325 mg per tablet, Take 1 tablet by mouth every 4 (four) hours as needed for moderate pain for up to 15 dosesMax Daily Amount: 6 tablets, Disp: 15 tablet, Rfl: 0    Allergies   Allergen Reactions    Penicillins Hives       Objective: There were no vitals filed for this visit  Right Knee Exam     Tenderness   Right knee tenderness location: Well healed surgical scar  Mild tendenress quadriceps tendon and overlying tibial button  Range of Motion   Extension: 0   Flexion: 120     Tests   Varus: negative Valgus: negative  Lachman:  Anterior - negative      Drawer:  Anterior - negative    Posterior - negative    Other   Erythema: absent  Sensation: normal  Pulse: present  Swelling: none  Effusion: no effusion present          Observations     Right Knee   Negative for effusion  Physical Exam  Musculoskeletal:      Right knee: He exhibits no effusion

## 2020-08-11 ENCOUNTER — OFFICE VISIT (OUTPATIENT)
Dept: PHYSICAL THERAPY | Facility: CLINIC | Age: 20
End: 2020-08-11
Payer: COMMERCIAL

## 2020-08-11 DIAGNOSIS — S83.511D RUPTURE OF ANTERIOR CRUCIATE LIGAMENT OF RIGHT KNEE, SUBSEQUENT ENCOUNTER: Primary | ICD-10-CM

## 2020-08-11 PROCEDURE — 97112 NEUROMUSCULAR REEDUCATION: CPT

## 2020-08-11 PROCEDURE — 97110 THERAPEUTIC EXERCISES: CPT

## 2020-08-11 NOTE — PROGRESS NOTES
Daily Note     Today's date: 2020  Patient name: Grayson Lopez  : 2000  MRN: [id number]  Referring provider: Nanda Turner  Dx:   Encounter Diagnosis     ICD-10-CM    1  Rupture of anterior cruciate ligament of right knee, subsequent encounter  S83 511D        Start Time: 0800  Stop Time: 0850  Total time in clinic (min): 50 minutes    Subjective: Patient denies pain in R knee today  He saw MD yesterday, who  D/C his R knee brace  MD is happy with progress thus far  He is to continue with strengthening and ROM of R knee as much as the protocol allows  Patient complains of stiffness under scar, describing it as a "paper cut" like annoyance  Objective: See treatment diary below      Assessment: Tolerated treatment well  Patient would benefit from continued PT  To improve R knee strength and improve overall functional mobility  Patient was educated on importance of performing exercises and activities within confines of protocol  Pt was provided with new HEP to address quad strength and stretching  He was instructed to perform scar massage throughout the day to decrease stiffness and improve mobility of scar  R quad weakness noted with SL balance and SL squatting  Plan: Continue per plan of care  Progress as per protocol  Patient to advance to next phase of protocol on 20        Precautions: Standard, ACL reconstruction 2020    Manuals    R patellar mobs          R knee PROM          R scar massage       x5'   STM ITB, quad, HS          R knee ext AROM 0deg 0 deg 0 deg 0 deg 0 deg  0 deg 0 deg   R knee flex AROM 123 deg 123 deg 125 deg 125 deg 125 deg 125 deg 125 deg   Neuro Re-Ed          Quad Sets  5s x 20     5x20s    Bridge  2 x 10 2 x 10        +on ball       x10   +SL       x10                       Ther Ex          Bike warmup 5'  5' 5' TM walk 8' 2 mph 5' L2 TM walk 5' 2 mph Bike 5' L2   SLR flex, abd, add   2x10, AROM, no brace Monster walks  3x10' GTB  3x10' GTB 3 x 10' GTB 3 x 10' GTB    Prone quad stretch    3x20s 3 x 20s 3 x 20s 3 x 20s 3 x 20s   Gastroc stretch, HS stretch 3 x 30s 3 x 30s 3 x 30s 3 x 30s 3 x 30s 3 x 30s 3 x 30s   Heel slides  2x10     2 x 10    Bosu lunge, gentle 2x10 2 x 10 2 x 10, add lat 2x10 for, lat 2 x 10 for, lat 2 x 10 2 x 10   Calf raises  3 x 10  x20 with add squeeze, eccentric focus   x20 x20 x20   Mini squats  3 x 10 90 deg 3 x 10 90 deg 3 x 10 100 deg  3x10, no brace  3 x 10, no brace 3 x 10, no brace   Step ups for, lat and retro added 7/28 2 x 10 2 risers 2 x 10 3 risers 2 x 10 2 risers, no brace 2 x 10 2 risers, no brace 2 x 10, 3 risers, no brace 2 x 10, 3 risers, no brace 2 x 10, 3 risers, no brace   TKE  5s x 10  5s x 10  5s x 10    Ball toss SL balance 2x10 2 x 10   2 x 10 on foam  2 x 10 on foam   Peanut knee flex       x20   Heel taps     2 x 10 (1 riser) 2 x 10 (1 riser)  2 x 10 (2 risers)   Prone HS curls  2x10 2 x 10  2 x 10      Pro stretch  10sx10 10s x 10 10s x 10 10s x 10 10s x 10 10s x 10   Hip abd ball on wall       x5   4 way dolores walk over   x5 x10 x10     Single, double leg wall slides    x10 x10 x10  x10   Star taps    x5 x5 x5 x5 x5   RDL   X20, 18# x20 18# x20 18# x20 18# x20 26#   SAQ, LAQ 5s x 20, 2 x 10 5s x 20, 2 x 10 5s x 20, 2 x 10 5s x 20, 2 x 10  2 x 10    Gait Training          Stair training   5' step over step descending stairs  5' step over step descending stairs        Ambulation with and without crutches, unlocked brace, uneven terrain   5' amb without brace  5' without brace, cue for heel to toe gait pattern  5' without brace, cue for heel to toe gait pattern     Modalities          Ice post  Def  10' 5'      Heat post      5' 5'

## 2020-08-13 ENCOUNTER — OFFICE VISIT (OUTPATIENT)
Dept: PHYSICAL THERAPY | Facility: CLINIC | Age: 20
End: 2020-08-13
Payer: COMMERCIAL

## 2020-08-13 DIAGNOSIS — S83.511D RUPTURE OF ANTERIOR CRUCIATE LIGAMENT OF RIGHT KNEE, SUBSEQUENT ENCOUNTER: Primary | ICD-10-CM

## 2020-08-13 PROCEDURE — 97140 MANUAL THERAPY 1/> REGIONS: CPT

## 2020-08-13 PROCEDURE — 97110 THERAPEUTIC EXERCISES: CPT

## 2020-08-13 NOTE — PROGRESS NOTES
Daily Note     Today's date: 2020  Patient name: Modesta France  : 2000  MRN: [id number]  Referring provider: Candice Maria  Dx:   Encounter Diagnosis     ICD-10-CM    1  Rupture of anterior cruciate ligament of right knee, subsequent encounter  S83 511D        Start Time: 0800  Stop Time: 0850  Total time in clinic (min): 50 minutes    Subjective: Patient complains of increased R quadriceps soreness today  He feels as though his scar is causing restrictions in ROM during stair navigation  Morning stiffness is noted when descending the stairs  Objective: See treatment diary below      Assessment: Patient fatigued quickly during today's session  Quad weakness and soreness noted with exercise  Patient was educated on importance of performing HEP as provided to prevent stiffness of R knee and improve R LE strength  Open chain exercises performed today due to increased soreness and stiffness of R knee  Tolerated treatment well  Patient would benefit from continued PT to improve R knee ROM and strength, and improve overall functional mobility  Plan: Continue per plan of care        Precautions: Standard, ACL reconstruction 2020    Manuals    R patellar mobs           R knee PROM           R scar massage       x5' x5'   STM ITB, quad, HS        x5'   R knee ext AROM 0deg 0 deg 0 deg 0 deg 0 deg  0 deg 0 deg 0 deg   R knee flex AROM 123 deg 123 deg 125 deg 125 deg 125 deg 125 deg 125 deg 125 deg   Neuro Re-Ed           Quad Sets  5s x 20     5x20s     Bridge  2 x 10 2 x 10         +on ball       x10    +SL       x10               Ther Ex           Bike warmup 5'  5' 5' TM walk 8' 2 mph 5' L2 TM walk 5' 2 mph Bike 5' L2 Bike 5' L4, 1 mi   SLR flex, abd, add   2x10, AROM, no brace        Monster walks  3x10' GTB  3x10' GTB 3 x 10' GTB 3 x 10' GTB     Prone quad stretch    3x20s 3 x 20s 3 x 20s 3 x 20s 3 x 20s 3 x 20s   Gastroc stretch, HS stretch 3 x 30s 3 x 30s 3 x 30s 3 x 30s 3 x 30s 3 x 30s 3 x 30s 3 x 30s   Heel slides  2x10     2 x 10  x20   Bosu lunge, gentle 2x10 2 x 10 2 x 10, add lat 2x10 for, lat 2 x 10 for, lat 2 x 10 2 x 10 2 x 10 for, lat, step ups   TG squats        Add jumps next visit   Calf raises  3 x 10  x20 with add squeeze, eccentric focus   x20 x20 x20 x20   Mini squats  3 x 10 90 deg 3 x 10 90 deg 3 x 10 100 deg  3x10, no brace  3 x 10, no brace 3 x 10, no brace 3 x 10  to 90 deg   Step ups for, lat and retro added 7/28 2 x 10 2 risers 2 x 10 3 risers 2 x 10 2 risers, no brace 2 x 10 2 risers, no brace 2 x 10, 3 risers, no brace 2 x 10, 3 risers, no brace 2 x 10, 3 risers, no brace 2 x 10, 3 risers   TKE  5s x 10  5s x 10  5s x 10     Ball toss SL balance 2x10 2 x 10   2 x 10 on foam  2 x 10 on foam    Heel taps     2 x 10 (1 riser) 2 x 10 (1 riser)  2 x 10 (2 risers)    Prone HS curls  2x10 2 x 10  2 x 10       Pro stretch  10sx10 10s x 10 10s x 10 10s x 10 10s x 10 10s x 10 10s x 10   Hip abd ball on wall       x5 3s x 10 B   4 way dolores walk over   x5 x10 x10      Single, double leg wall slides    x10 x10 x10  x10    Star taps    x5 x5 x5 x5 x5    RDL   X20, 18# x20 18# x20 18# x20 18# x20 26# x20 26#   SAQ, LAQ 5s x 20, 2 x 10 5s x 20, 2 x 10 5s x 20, 2 x 10 5s x 20, 2 x 10  2 x 10     Gait Training           Stair training   5' step over step descending stairs  5' step over step descending stairs         Ambulation with and without crutches, unlocked brace, uneven terrain   5' amb without brace  5' without brace, cue for heel to toe gait pattern  5' without brace, cue for heel to toe gait pattern      Modalities           Ice post  Def  10' 5'       Heat post      5' 5'  5'

## 2020-08-14 ENCOUNTER — OFFICE VISIT (OUTPATIENT)
Dept: PHYSICAL THERAPY | Facility: CLINIC | Age: 20
End: 2020-08-14
Payer: COMMERCIAL

## 2020-08-14 DIAGNOSIS — S83.511D RUPTURE OF ANTERIOR CRUCIATE LIGAMENT OF RIGHT KNEE, SUBSEQUENT ENCOUNTER: Primary | ICD-10-CM

## 2020-08-14 PROCEDURE — 97110 THERAPEUTIC EXERCISES: CPT

## 2020-08-14 PROCEDURE — 97140 MANUAL THERAPY 1/> REGIONS: CPT

## 2020-08-14 PROCEDURE — 97112 NEUROMUSCULAR REEDUCATION: CPT

## 2020-08-14 NOTE — PROGRESS NOTES
Daily Note     Today's date: 2020  Patient name: Michael Antony  : 2000  MRN: [id number]  Referring provider: Ministerio Maddox  Dx:   Encounter Diagnosis     ICD-10-CM    1  Rupture of anterior cruciate ligament of right knee, subsequent encounter  S83 511D        Start Time: 0800  Stop Time: 0850  Total time in clinic (min): 50 minutes    Subjective: Patient reports increased soreness and stiffness in R knee today  He denies pain in his R knee  He is concerned about his soreness at this time  Objective: See treatment diary below      Assessment: Increased R knee soreness noted with eccentric step downs and retro stair navigation  SL squats performed with no increases in pain  Stretching and STM to R quad implemented to decrease soreness and stiffness  Patient reports less soreness and stiffness at end of session  Tolerated treatment well  Patient would benefit from continued PT to improve R knee ROM and strength, and improve overall functional mobility  Plan: Continue per plan of care  Progress to next phase of protocol on 2020        Precautions: Standard, ACL reconstruction 2020    Manuals       R patellar mobs         R knee PROM         R scar massage x5' x5' x5'      STM ITB, quad, HS  x5' x5'      R knee ext AROM 0 deg 0 deg 0 deg      R knee flex AROM 125 deg 125 deg 125 deg      Neuro Re-Ed         [facility] [employer]          +on ball x10        +SL x10                 Ther Ex         Bike warmup Bike 5' L2 Bike 5' L4, 1 mi TM walk 5' 2 6 mph      SLR flex, abd, add         Monster walks         Prone quad stretch  3 x 20s 3 x 20s 3 x 20s      Gastroc stretch, HS stretch 3 x 30s 3 x 30s 3 x 30s      Heel slides   x20 x20      Bosu lunge, gentle 2 x 10 2 x 10 for, lat, step ups 2 x 10 for, lat, step ups      TG squats  Add jumps next visit Squats 2 x 10 L16, SL 2 x 10, squat jumps 2 x 10      Calf raises  x20 x20 x20      Mini squats  3 x 10, no brace 3 x 10  to 90 deg 2 x 10      Step ups for, lat and retro added 7/28 2 x 10, 3 risers, no brace 2 x 10, 3 risers 2 x 10 3 risers      TKE         Ball toss SL balance 2 x 10 on foam  3 x 30s on foam      Heel taps  2 x 10 (2 risers)  2 x 10 (2 risers)      Prone HS curls          Pro stretch 10s x 10 10s x 10 10s x 10      Hip abd ball on wall x5 3s x 10 B       4 way dolores walk over         Single, double leg wall slides  x10        Star taps  x5        RDL x20 26# x20 26#       SAQ, LAQ         Gait Training         Stair training          Ambulation with and without crutches, unlocked brace, uneven terrain         Modalities         Ice post          Heat post   5' 5'

## 2020-08-18 ENCOUNTER — OFFICE VISIT (OUTPATIENT)
Dept: PHYSICAL THERAPY | Facility: CLINIC | Age: 20
End: 2020-08-18
Payer: COMMERCIAL

## 2020-08-18 DIAGNOSIS — S83.511D RUPTURE OF ANTERIOR CRUCIATE LIGAMENT OF RIGHT KNEE, SUBSEQUENT ENCOUNTER: Primary | ICD-10-CM

## 2020-08-18 PROCEDURE — 97112 NEUROMUSCULAR REEDUCATION: CPT

## 2020-08-18 PROCEDURE — 97110 THERAPEUTIC EXERCISES: CPT

## 2020-08-18 NOTE — PROGRESS NOTES
Daily Note     Today's date: 2020  Patient name: Florence Downey  : 2000  MRN: [id number]  Referring provider: Sonya Olivas  Dx:   Encounter Diagnosis     ICD-10-CM    1  Rupture of anterior cruciate ligament of right knee, subsequent encounter  S83 511D        Start Time: 0800  Stop Time: 0850  Total time in clinic (min): 50 minutes    Subjective: Patient reports increased R stiffness over the weekend  He feels as though his incision is stiff  He has been completing scar massage on his incision frequently throughout the day  Patient still does not feel comfortable going back to work at [employer] due to increased pain and stiffness in R knee  Objective: See treatment diary below      Assessment: Tolerated treatment well  Patient would benefit from continued PT to improve R knee strength and ROM, and return patient to work  Patient demonstrated increased R knee stiffness and pain when attempting to perform box lift from floor to simulate work tasks  Patient does not feel comfortable being in the crouched position due to stiffness  Scar massage performed to decrease adhesions  R SL squats are difficult due to weakness of R quad  Plan: Continue per plan of care  Progress to next phase of protocol next visit (Phase IV)        Precautions: Standard, ACL reconstruction 2020    Manuals      R patellar mobs         R knee PROM         R scar massage x5' x5' x5' 5'     STM ITB, quad, HS  x5' x5'      R knee ext AROM 0 deg 0 deg 0 deg 0 deg     R knee flex AROM 125 deg 125 deg 125 deg 125 deg     Neuro Re-Ed         [employer] [facility]          +on ball x10        +SL x10                 Ther Ex         Bike warmup Bike 5' L2 Bike 5' L4, 1 mi TM walk 5' 2 6 mph TM walk 5' 2 6 mph     SLR flex, abd, add    Rev     Monster walks    6 x 10'      Prone quad stretch  3 x 20s 3 x 20s 3 x 20s 3 x 20s     Gastroc stretch, HS stretch 3 x 30s 3 x 30s 3 x 30s 3 x 30s Heel slides   x20 x20      Bosu lunge, gentle 2 x 10 2 x 10 for, lat, step ups 2 x 10 for, lat, step ups 2 x 10 for, lat, step ups     TG squats  Add jumps next visit Squats 2 x 10 L16, SL 2 x 10, squat jumps 2 x 10 Squats 3 x 10 L18, SL 2 x 10, squat jumps 2 x 10     Calf raises  x20 x20 x20 Rev     Mini squats  3 x 10, no brace 3 x 10  to 90 deg 2 x 10 Rev     Step ups for, lat and retro added 7/28 2 x 10, 3 risers, no brace 2 x 10, 3 risers 2 x 10 3 risers 2 x 10 3 risers     TKE    Rev     Ball toss SL balance 2 x 10 on foam  3 x 30s on foam      Heel taps  2 x 10 (2 risers)  2 x 10 (2 risers) 2 x 10 (1 riser, soreness)     Prone HS curls          Pro stretch 10s x 10 10s x 10 10s x 10 10s x 10     Hip abd ball on wall x5 3s x 10 B       4 way dolores walk over         Bungee walk    4 x 50'     Single, double leg wall slides  x10   x10     Star taps  x5        RDL x20 26# x20 26#  x20 26#     SAQ, LAQ    2s x 20 2#     Gait Training         Stair training          Ambulation with and without crutches, unlocked brace, uneven terrain         Modalities         Ice post          Heat post   5' 5' 5'

## 2020-08-20 ENCOUNTER — APPOINTMENT (OUTPATIENT)
Dept: PHYSICAL THERAPY | Facility: CLINIC | Age: 20
End: 2020-08-20
Payer: COMMERCIAL

## 2020-08-21 ENCOUNTER — APPOINTMENT (OUTPATIENT)
Dept: PHYSICAL THERAPY | Facility: CLINIC | Age: 20
End: 2020-08-21
Payer: COMMERCIAL

## 2020-08-25 ENCOUNTER — APPOINTMENT (OUTPATIENT)
Dept: PHYSICAL THERAPY | Facility: CLINIC | Age: 20
End: 2020-08-25
Payer: COMMERCIAL

## 2020-08-26 ENCOUNTER — TELEPHONE (OUTPATIENT)
Dept: PHYSICAL THERAPY | Facility: CLINIC | Age: 20
End: 2020-08-26

## 2020-08-27 ENCOUNTER — APPOINTMENT (OUTPATIENT)
Dept: PHYSICAL THERAPY | Facility: CLINIC | Age: 20
End: 2020-08-27
Payer: COMMERCIAL

## 2020-08-28 ENCOUNTER — OFFICE VISIT (OUTPATIENT)
Dept: PHYSICAL THERAPY | Facility: CLINIC | Age: 20
End: 2020-08-28
Payer: COMMERCIAL

## 2020-08-28 ENCOUNTER — APPOINTMENT (OUTPATIENT)
Dept: PHYSICAL THERAPY | Facility: CLINIC | Age: 20
End: 2020-08-28
Payer: COMMERCIAL

## 2020-08-28 DIAGNOSIS — S83.511D RUPTURE OF ANTERIOR CRUCIATE LIGAMENT OF RIGHT KNEE, SUBSEQUENT ENCOUNTER: Primary | ICD-10-CM

## 2020-08-28 PROCEDURE — 97112 NEUROMUSCULAR REEDUCATION: CPT

## 2020-08-28 PROCEDURE — 97110 THERAPEUTIC EXERCISES: CPT

## 2020-08-28 NOTE — PROGRESS NOTES
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       Objective: See treatment diary below      Assessment: Tolerated treatment well  Single leg squat on TG  a little challenging achieving full extension; fatigued with cross over walk w GTB; tolerated new plyometrics added by primary PT;  scar tender - educated in towel roll placement in prone to decrease pressure on scar for knee flex stretch; Patient would benefit from continued PT      Plan: Continue per plan of care   Continue 1 x  Week due to insurance; patient issued updated HEP & GTB      Precautions: Standard, ACL reconstruction 2020    Manuals     R patellar mobs         R knee PROM         R scar massage x5' x5' x5' 5' 5'    STM ITB, quad, HS  x5' x5'      R knee ext AROM 0 deg 0 deg 0 deg 0 deg 0 deg    R knee flex AROM 125 deg 125 deg 125 deg 125 deg 125 deg    Neuro Re-Ed         [facility] [employer]          +on ball x10        +SL x10                 Ther Ex         Bike warmup Bike 5' L2 Bike 5' L4, 1 mi TM walk 5' 2 6 mph TM walk 5' 2 6 mph TM walk   5' 2 6 mph    SLR flex, abd, add    Rev rev    Monster walks    6 x 10'  6 x 10' - DC    Prone quad stretch  3 x 20s 3 x 20s 3 x 20s 3 x 20s 3 x 20s    Gastroc stretch, HS stretch 3 x 30s 3 x 30s 3 x 30s 3 x 30s 3 x30s    Heel slides   x20 x20  Jump squat x 10     Bosu lunge, gentle 2 x 10 2 x 10 for, lat, step ups 2 x 10 for, lat, step ups 2 x 10 for, lat, step ups 2 x 10 for, lat step ups - NP TG squats  Add jumps next visit Squats 2 x 10 L16, SL 2 x 10, squat jumps 2 x 10 Squats 3 x 10 L18, SL 2 x 10, squat jumps 2 x 10 Squats 2 x 10 L 20  SL 2 x 10, squat jumps 2 x 10     Calf raises  x20 x20 x20 Rev Jump from step  lvl 2 x 10     Mini squats  3 x 10, no brace 3 x 10  to 90 deg 2 x 10 Rev rev    Step ups for, lat and retro added 7/28 2 x 10, 3 risers, no brace 2 x 10, 3 risers 2 x 10 3 risers 2 x 10 3 risers 2 x 10 ea 3 risers     TKE    Rev     Ball toss SL balance 2 x 10 on foam  3 x 30s on foam      Heel taps  2 x 10 (2 risers)  2 x 10 (2 risers) 2 x 10 (1 riser, soreness) 2 x 10 1 riser     Prone HS curls          Pro stretch 10s x 10 10s x 10 10s x 10 10s x 10 10 s x 10     Hip abd ball on wall x5 3s x 10 B       4 way dolores walk over         Bungee walk    4 x 50' Cross band walk GTB 4 x 50'    Single, double leg wall slides  x10   x10     Star taps  x5        RDL x20 26# x20 26#  x20 26# X 20 26#    SAQ, LAQ    2s x 20 2# 5s/slow lower 2# x 20 ea     Gait Training         Stair training          Ambulation with and without crutches, unlocked brace, uneven terrain         Modalities         Ice post          Heat post   5' 5' 5' 5'

## 2020-09-04 ENCOUNTER — EVALUATION (OUTPATIENT)
Dept: PHYSICAL THERAPY | Facility: CLINIC | Age: 20
End: 2020-09-04
Payer: COMMERCIAL

## 2020-09-04 DIAGNOSIS — S83.511D RUPTURE OF ANTERIOR CRUCIATE LIGAMENT OF RIGHT KNEE, SUBSEQUENT ENCOUNTER: ICD-10-CM

## 2020-09-04 DIAGNOSIS — Z98.890 S/P ACL RECONSTRUCTION: Primary | ICD-10-CM

## 2020-09-04 PROCEDURE — 97164 PT RE-EVAL EST PLAN CARE: CPT

## 2020-09-04 PROCEDURE — 97110 THERAPEUTIC EXERCISES: CPT

## 2020-09-04 NOTE — PROGRESS NOTES
PT Re-evaluation/Daily Note     Today's date: 2020  Patient name: Billy Lott  : 2000  MRN: [id number]  Referring provider: Jonna Blanco  Dx:   Encounter Diagnosis     ICD-10-CM    1  S/P ACL reconstruction  Z98 890    2  Rupture of anterior cruciate ligament of right knee, subsequent encounter  S83 511D        Start Time: 1015  Stop Time: 1100  Total time in clinic (min): 45 minutes    Subjective: Patient reports 5/10 R knee pain  He complains of difficulties performing stairs at home and in community, standing greater than 1 hour, and squatting below 90 degrees to reach an object on the bottom shelf of the cabinet  He is still not able to return to work due to increased pain and soreness in R knee, inability to stand for an 8 hour shift, and sustaining a squat position for greater than 5 minutes  Objective: See treatment diary below    Goals  STGs to be achieved in 3 weeks  STG 1: Patient will be independent with HEP - MET  STG 2: Patient will have decreased R knee pain to less than 2/10 in order to sleep through the night -MET  STG 3: Patient will ambulate with least restrictive device and less than 2/10 pain -PARTIALLY MET  STG 4: Patient will be able to perform self care independently - MET    LTGs to be achieved in 12 weeks  LTG 1: Patient will improve R knee ROM by 75% in order to be functionally independent -MET  LTG 2: Patient will have improved strength to greater than 4/5 in order to return being independent in the community - MET  LTG 3: Patient will demonstrate improved quad activate during all LE exercises in order to ambulate independently - PARTIALLY MET  LTG 4: Patient will Have greater than 120 degrees of R knee flexion to be able to ride dirt bike - MET  LTG 5: Patient will ambulate with no gait deviations and no pain in order to return to work -NOT MET  LTG 6: Patient will be able to start dirt bike with R LE and no pain - NOT MET    LTG 7: Patient will be able to run greater than 1 mile with no R knee pain  - NOT MET  LTG 8: Patient able to stand for 8 hours on uneven terrain to return to work  - NOT  MET  LTG 9: Patient able to maintain deep squat for greater than 5 minutes to return to work related activities - NOT MET  R knee A/PROM 0-130 deg   L knee A/PROM 0-130 deg    [place] Screen: L LE 5/5   R Hip flexion 5/5    R Hip ext 5/5   R Hip abd 5/5   R Hip add 5/5   R Hip IR 5/5   R Hip ER 5/5      R Knee flex: 4+/5    R Knee ext: 4+/5     R Ankle DF 5/5    R Ankle PF 5/5   R Ankle IV 5/5   R Ankle EV 5/5     FOTO goal: 51%   IE: 10%    7/2: 25%   7/17: 49%    8/6: 52%    9/4: 37%    Assessment: Patient demonstrates extreme fatigue post treatment session today  He demonstrates improvements in R knee strength, ROM, and gait mechanics, despite increases in R knee pain  Patient is unable to tolerate deep squatting position due to increased R knee pain  R quad activation improved, but demonstrated difficulties with ascending and descending stairs  Patient is unable to run at this time  Jumping exercises increases pain and unsteadiness in R LE  Tolerated treatment fair  Patient would benefit from continued PT to improve activity tolerance, standing and walking tolerance, and return patient to work when able  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 and perform deep squatting for greater than 5 minutes to complete job appropriately  He is unable to complete these tasks at this time due to above mentioned limitations  Plan: Continue per plan of care        Precautions: Standard, ACL reconstruction 6/18/2020    Manuals 8/11 8/13 8/14 8/18 8/28 9/4   R patellar mobs         R knee PROM         R scar massage x5' x5' x5' 5' 5'    STM ITB, quad, HS  x5' x5'      R knee ext AROM 0 deg 0 deg 0 deg 0 deg 0 deg 0 deg   R knee flex AROM 125 deg 125 deg 125 deg 125 deg 125 deg 130 deg   Neuro Re-Ed         [facility] +[facility] x10        +SL x10                 Ther Ex         Bike warmup Bike 5' L2 Bike 5' L4, 1 mi TM walk 5' 2 6 mph TM walk 5' 2 6 mph TM walk   5' 2 6 mph TM walk   8' 3 mph   Slider 3 way      x10 ea   Monster walks    6 x 10'  6 x 10' - DC    Prone quad stretch  3 x 20s 3 x 20s 3 x 20s 3 x 20s 3 x 20s    Gastroc stretch, HS stretch 3 x 30s 3 x 30s 3 x 30s 3 x 30s 3 x30s 3 x30s   Heel slides   x20 x20  Jump squat x 10  Jump squat x 10, broad jump x 10    Bosu lunge, gentle 2 x 10 2 x 10 for, lat, step ups 2 x 10 for, lat, step ups 2 x 10 for, lat, step ups 2 x 10 for, lat step ups - NP    TG squats  Add jumps next visit Squats 2 x 10 L16, SL 2 x 10, squat jumps 2 x 10 Squats 3 x 10 L18, SL 2 x 10, squat jumps 2 x 10 Squats 2 x 10 L 20  SL 2 x 10, squat jumps 2 x 10  Squats 2 x 10 L 20  SL 2 x 10, squat jumps 2 x 10    Calf raises  x20 x20 x20 Rev Jump from step  lvl 2 x 10  Jump from step  lvl 2 x 10    Mini squats  3 x 10, no brace 3 x 10  to 90 deg 2 x 10 Rev rev Deep squats x10, pain   Step ups for, lat and retro added 7/28 2 x 10, 3 risers, no brace 2 x 10, 3 risers 2 x 10 3 risers 2 x 10 3 risers 2 x 10 ea 3 risers     Ball toss SL balance 2 x 10 on foam  3 x 30s on foam      Heel taps  2 x 10 (2 risers)  2 x 10 (2 risers) 2 x 10 (1 riser, soreness) 2 x 10 1 riser     Pro stretch 10s x 10 10s x 10 10s x 10 10s x 10 10 s x 10  10s x 10   Cross band walk     4 x 10' 4 x 10'   Bungee walk    4 x 50'  4 x 50'   Single, double leg wall slides  x10   x10  x10   Star taps  x5     x10   RDL x20 26# x20 26#  x20 26# X 20 26# X 20 26#   SAQ, LAQ    2s x 20 2# 5s/slow lower 2# x 20 ea     Gait Training         Stair training          Modalities         Ice post          Heat post   5' 5' 5' 5'  5'

## 2020-09-11 ENCOUNTER — OFFICE VISIT (OUTPATIENT)
Dept: PHYSICAL THERAPY | Facility: CLINIC | Age: 20
End: 2020-09-11
Payer: COMMERCIAL

## 2020-09-11 DIAGNOSIS — S83.511D RUPTURE OF ANTERIOR CRUCIATE LIGAMENT OF RIGHT KNEE, SUBSEQUENT ENCOUNTER: Primary | ICD-10-CM

## 2020-09-11 DIAGNOSIS — Z98.890 S/P ACL RECONSTRUCTION: ICD-10-CM

## 2020-09-11 PROCEDURE — 97110 THERAPEUTIC EXERCISES: CPT

## 2020-09-11 PROCEDURE — 97112 NEUROMUSCULAR REEDUCATION: CPT

## 2020-09-11 NOTE — PROGRESS NOTES
Daily Note     Today's date: 2020  Patient name: Jessica Flood  : 2000  MRN: [id number]  Referring provider: Dianne Sandoval  Dx:   Encounter Diagnosis     ICD-10-CM    1  Rupture of anterior cruciate ligament of right knee, subsequent encounter  S83 511D    2  S/P ACL reconstruction  Z98 890        Start Time: 0845  Stop Time: 930  Total time in clinic (min): 45 minutes    Subjective: Patient denies R knee pain at this time  He is having difficulties with ambulating long distances  He is still unable to return to work due to weakness, inability to stand for greater than 8 hours, He has not returned to running at this time  He complains of increased pain around his incision  Objective: See treatment diary below      Assessment: As per protocol, patient is able to start light jogging  Alter jog at below listed speeds was implemented to acclimate patient to running  He denies pain, instability, or weakness when jogging at 50% body weight  Patient is still unable to return to work due to inability to perform deep squat, standing greater than an hour at a time, and walking for greater than an hour  He demonstrates improvements in R LE strength, ROM, and functional mobility  He requires further strengthening to perform higher level strengthening, running, and ballistic movements to start dirt bike  KT tape applied to scar to decrease pain and improve mobility of scar  Patient was educated on performing simulated dirt bike start exercise at home, as well a scar massage with Vitamin E oil  Tolerated treatment well  Patient would benefit from continued PT      Plan: Continue per plan of care        Precautions: Standard, ACL reconstruction 2020    Manuals    R patellar mobs          R knee PROM          R scar massage x5' x5' x5' 5' 5'     KT tape        AR   STM ITB, quad, HS  x5' x5'    x5' IASTM to scar   R knee ext AROM 0 deg 0 deg 0 deg 0 deg 0 deg 0 deg 0 deg   R knee flex AROM 125 deg 125 deg 125 deg 125 deg 125 deg 130 deg 130 deg   Neuro Re-Ed          [facility]           +on ball x10         +SL x10                   Ther Ex          Bike warmup Bike 5' L2 Bike 5' L4, 1 mi TM walk 5' 2 6 mph TM walk 5' 2 6 mph TM walk   5' 2 6 mph TM walk   8' 3 mph Bike 5' L2   Alter G       x10' Walk to jog 2 0-5 mph at 50% BW   Slider 3 way      x10 ea    Monster walks    6 x 10'  6 x 10' - DC     Prone quad stretch  3 x 20s 3 x 20s 3 x 20s 3 x 20s 3 x 20s     Gastroc stretch, HS stretch 3 x 30s 3 x 30s 3 x 30s 3 x 30s 3 x30s 3 x30s    Heel slides   x20 x20  Jump squat x 10  Jump squat x 10, broad jump x 10     Bosu lunge, gentle 2 x 10 2 x 10 for, lat, step ups 2 x 10 for, lat, step ups 2 x 10 for, lat, step ups 2 x 10 for, lat step ups - NP     TG squats  Add jumps next visit Squats 2 x 10 L16, SL 2 x 10, squat jumps 2 x 10 Squats 3 x 10 L18, SL 2 x 10, squat jumps 2 x 10 Squats 2 x 10 L 20  SL 2 x 10, squat jumps 2 x 10  Squats 2 x 10 L 20  SL 2 x 10, squat jumps 2 x 10  Squats 2 x 10 L 20  SL 2 x 10, squat jumps 2 x 10    Calf raises  x20 x20 x20 Rev Jump from step  lvl 2 x 10  Jump from step  lvl 2 x 10     Mini squats  3 x 10, no brace 3 x 10  to 90 deg 2 x 10 Rev rev Deep squats x10, pain    Step ups for, lat and retro added 7/28 2 x 10, 3 risers, no brace 2 x 10, 3 risers 2 x 10 3 risers 2 x 10 3 risers 2 x 10 ea 3 risers      Ball toss SL balance 2 x 10 on foam  3 x 30s on foam       Heel taps  2 x 10 (2 risers)  2 x 10 (2 risers) 2 x 10 (1 riser, soreness) 2 x 10 1 riser      Pro stretch 10s x 10 10s x 10 10s x 10 10s x 10 10 s x 10  10s x 10    Cross band walk     4 x 10' 4 x 10'    Bungee walk    4 x 50'  4 x 50' 4 x 50'   Single, double leg wall slides  x10   x10  x10    Star taps  x5     x10    RDL x20 26# x20 26#  x20 26# X 20 26# X 20 26# X 20 26#   SAQ, LAQ    2s x 20 2# 5s/slow lower 2# x 20 ea      Gait Training          Stair training Modalities          Ice post           Heat post   5' 5' 5' 5'  5'

## 2020-09-17 ENCOUNTER — TELEPHONE (OUTPATIENT)
Dept: PHYSICAL THERAPY | Facility: CLINIC | Age: 20
End: 2020-09-17

## 2020-09-17 NOTE — TELEPHONE ENCOUNTER
Contacted patient regarding status of insurance visits  Patient was denied additional visits  He was informed of appeal process, length of time associated with appeal process, and difficulties associated with self-pay  He verbalized understanding of insurance authorization  Patient was educated on importance of performing exercises daily to continue improving in function to return to work  He was informed to reach out to PT if there are any questions, concerns, or problems associated with R ACL repair

## 2020-09-18 ENCOUNTER — TELEPHONE (OUTPATIENT)
Dept: PHYSICAL THERAPY | Facility: CLINIC | Age: 20
End: 2020-09-18

## 2020-09-18 ENCOUNTER — APPOINTMENT (OUTPATIENT)
Dept: PHYSICAL THERAPY | Facility: CLINIC | Age: 20
End: 2020-09-18
Payer: COMMERCIAL

## 2020-09-18 NOTE — TELEPHONE ENCOUNTER
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

## 2020-09-21 ENCOUNTER — OFFICE VISIT (OUTPATIENT)
Dept: OBGYN CLINIC | Facility: CLINIC | Age: 20
End: 2020-09-21
Payer: COMMERCIAL

## 2020-09-21 VITALS — WEIGHT: 138.4 LBS | TEMPERATURE: 97.8 F | BODY MASS INDEX: 22.24 KG/M2 | HEIGHT: 66 IN

## 2020-09-21 DIAGNOSIS — Z98.890 S/P ACL RECONSTRUCTION: Primary | ICD-10-CM

## 2020-09-21 PROCEDURE — 99213 OFFICE O/P EST LOW 20 MIN: CPT | Performed by: ORTHOPAEDIC SURGERY

## 2020-09-21 NOTE — PROGRESS NOTES
Assessment/Plan:  1  S/P ACL reconstruction, right  Ambulatory referral to Physical Therapy       Scribe Attestation    I,:   Rosalia Santana am acting as a scribe while in the presence of the attending physician :        I,:   Desire Costa MD personally performed the services described in this documentation    as scribed in my presence :          I do believe as a is progressing well 13 5 weeks status post   Unfortunately, it is crucial that he continue with physical therapy on the ACL reconstruction protocol  Since his insurance company is denying more visits, I provided him with a prescription to sports performance so they may continue his rehabilitation  I will have him follow up in 6 weeks for repeat clinical evaluation  At that time, I do anticipate him being able to jog on flat surfaces  PT notes were reviewed today in the office  Subjective:   Jessica Flood is a 21 y o  male who presents to the office today for 13 5 weeks status post right knee arthroscopy, ACL reconstruction with quadriceps tendon autograft  He states he was compliant with formal physical therapy, however his insurance has denied more physical therapy visits  He states he ran on the gravity treadmill once and has not ran since then  He does report soreness about his quadriceps tendon  He does appreciate weakness about his leg and difficulty with ambulating for long periods of time  He denies any incident of instability  He denies any radicular symptoms  He denies any numbness and tingling  Review of Systems   Constitutional: Negative for chills, fever and unexpected weight change  HENT: Negative for hearing loss, nosebleeds and sore throat  Eyes: Negative for pain, redness and visual disturbance  Respiratory: Negative for cough, shortness of breath and wheezing  Cardiovascular: Negative for chest pain, palpitations and leg swelling  Gastrointestinal: Negative for abdominal pain, nausea and vomiting  Endocrine: Negative for polyphagia and polyuria  Genitourinary: Negative for dysuria and hematuria  Musculoskeletal:        See HPI   Skin: Negative for rash and wound  Neurological: Negative for dizziness, numbness and headaches  Psychiatric/Behavioral: Negative for decreased concentration and suicidal ideas  The patient is not nervous/anxious            Past Medical History:   Diagnosis Date    Anesthesia complication     difficulty waking up    Piercing     ears    Testicle pain     varicele       Past Surgical History:   Procedure Laterality Date    APPENDECTOMY      ORCHIOPEXY Bilateral 1/20/2018    Procedure: LEFT SCROTAL EXPLORATION, BILATERAL ORCHIOPEXY;  Surgeon: Sola Escalante MD;  Location: [address] [place] [address];  Service: Urology    CA EXCISE VARICOCELE Left 2/14/2019    Procedure: VARICOCELECTOMY;  Surgeon: Sola Escalante MD;  Location: [address] [place] [address];  Service: Urology    CA KNEE SCOPE,AID ANT CRUCIATE REPAIR Right 6/18/2020    Procedure: ARTHROSCOPIC RECONSTRUCTION ANTERIOR CRUCIATE LIGAMENT (ACL) WITH QUADRICEPS TENDON AUTOGRAFT;  Surgeon: Viet Chau MD;  Location[facility];  Service: Orthopedics    TONSILLECTOMY         Family History   Problem Relation Age of Onset    No Known Problems Mother     Hypertension Father     Glaucoma Father     Asthma Brother     Asthma Brother        Social History     Occupational History    Not on file   Tobacco Use    Smoking status: Current Some Day Smoker     Types: E-Cigarettes    Smokeless tobacco: Current User   Substance and Sexual Activity    Alcohol use: Yes     Comment: occ    Drug use: No    Sexual activity: Yes     Partners: Female     Birth control/protection: Condom Male         Current Outpatient Medications:     acetaminophen (TYLENOL) 650 mg CR tablet, Take 1 tablet (650 mg total) by mouth every 8 (eight) hours as needed for mild pain (Patient not taking: Reported on 9/21/2020), Disp: 30 tablet, Rfl: 0    fluticasone (FLONASE) 50 mcg/act nasal spray, 1 spray into each nostril daily (Patient not taking: Reported on 5/27/2020), Disp: 16 g, Rfl: 0    ibuprofen (MOTRIN) 600 mg tablet, Take 1 tablet (600 mg total) by mouth every 6 (six) hours as needed for moderate pain (Patient not taking: Reported on 9/21/2020), Disp: 30 tablet, Rfl: 0    naproxen (EC NAPROSYN) 500 MG EC tablet, Take 1 tablet (500 mg total) by mouth 2 (two) times a day with meals (Patient not taking: Reported on 5/27/2020), Disp: 20 tablet, Rfl: 0    oxyCODONE-acetaminophen (PERCOCET) 5-325 mg per tablet, Take 1 tablet by mouth every 4 (four) hours as needed for moderate pain for up to 15 dosesMax Daily Amount: 6 tablets (Patient not taking: Reported on 9/21/2020), Disp: 15 tablet, Rfl: 0    Allergies   Allergen Reactions    Penicillins Hives       Objective:  Vitals:    09/21/20 0804   Temp: 97 8 °F (36 6 °C)       Right Knee Exam     Tenderness   The patient is experiencing no tenderness  Range of Motion   Extension: 0   Flexion: 120     Tests   Varus: negative Valgus: negative  Lachman:  Anterior - negative    Posterior - negative  Drawer:  Anterior - negative    Posterior - negative    Other   Erythema: absent  Scars: present (Well-healed surgical incisions )  Sensation: normal  Pulse: present (2+ dorsal pedal)  Swelling: none  Effusion: no effusion present          Observations     Right Knee   Negative for effusion  Physical Exam  Vitals signs and nursing note reviewed  Constitutional:       Appearance: He is well-developed  HENT:      Head: Normocephalic and atraumatic  Eyes:      Conjunctiva/sclera: Conjunctivae normal       Pupils: Pupils are equal, round, and reactive to light  Neck:      Musculoskeletal: Normal range of motion and neck supple  Cardiovascular:      Rate and Rhythm: Normal rate  Pulmonary:      Effort: Pulmonary effort is normal  No respiratory distress  Musculoskeletal:      Right knee: He exhibits no effusion  Comments: As noted in HPI   Skin:     General: Skin is warm and dry  Neurological:      Mental Status: He is alert and oriented to person, place, and time     Psychiatric:         Behavior: Behavior normal

## 2020-09-25 ENCOUNTER — APPOINTMENT (OUTPATIENT)
Dept: PHYSICAL THERAPY | Facility: CLINIC | Age: 20
End: 2020-09-25
Payer: COMMERCIAL

## 2020-11-04 ENCOUNTER — OFFICE VISIT (OUTPATIENT)
Dept: OBGYN CLINIC | Facility: CLINIC | Age: 20
End: 2020-11-04
Payer: COMMERCIAL

## 2020-11-04 DIAGNOSIS — Z98.890 S/P ACL RECONSTRUCTION: Primary | ICD-10-CM

## 2020-11-04 PROCEDURE — 99213 OFFICE O/P EST LOW 20 MIN: CPT | Performed by: ORTHOPAEDIC SURGERY

## 2020-11-17 ENCOUNTER — HOSPITAL ENCOUNTER (EMERGENCY)
Facility: HOSPITAL | Age: 20
Discharge: HOME | End: 2020-11-17
Attending: EMERGENCY MEDICINE
Payer: COMMERCIAL

## 2020-11-17 ENCOUNTER — APPOINTMENT (EMERGENCY)
Dept: RADIOLOGY | Facility: HOSPITAL | Age: 20
End: 2020-11-17
Payer: COMMERCIAL

## 2020-11-17 VITALS
TEMPERATURE: 99.7 F | HEART RATE: 61 BPM | BODY MASS INDEX: 22.24 KG/M2 | OXYGEN SATURATION: 100 % | DIASTOLIC BLOOD PRESSURE: 64 MMHG | SYSTOLIC BLOOD PRESSURE: 115 MMHG | WEIGHT: 137.8 LBS | RESPIRATION RATE: 16 BRPM

## 2020-11-17 DIAGNOSIS — R07.89 ATYPICAL CHEST PAIN: Primary | ICD-10-CM

## 2020-11-17 DIAGNOSIS — M94.0 COSTOCHONDRITIS: ICD-10-CM

## 2020-11-17 LAB
ALBUMIN SERPL BCP-MCNC: 4 G/DL (ref 3.5–5)
ALP SERPL-CCNC: 76 U/L (ref 46–116)
ALT SERPL W P-5'-P-CCNC: 18 U/L (ref 12–78)
ANION GAP SERPL CALCULATED.3IONS-SCNC: 6 MMOL/L (ref 4–13)
APTT PPP: 31 SECONDS (ref 23–37)
AST SERPL W P-5'-P-CCNC: 16 U/L (ref 5–45)
BASOPHILS # BLD AUTO: 0.05 THOUSANDS/ΜL (ref 0–0.1)
BASOPHILS NFR BLD AUTO: 1 % (ref 0–1)
BILIRUB SERPL-MCNC: 0.4 MG/DL (ref 0.2–1)
BUN SERPL-MCNC: 13 MG/DL (ref 5–25)
CALCIUM SERPL-MCNC: 9 MG/DL (ref 8.3–10.1)
CHLORIDE SERPL-SCNC: 104 MMOL/L (ref 100–108)
CO2 SERPL-SCNC: 33 MMOL/L (ref 21–32)
CREAT SERPL-MCNC: 0.89 MG/DL (ref 0.6–1.3)
D DIMER PPP FEU-MCNC: 0.33 UG/ML FEU
EOSINOPHIL # BLD AUTO: 0.23 THOUSAND/ΜL (ref 0–0.61)
EOSINOPHIL NFR BLD AUTO: 2 % (ref 0–6)
ERYTHROCYTE [DISTWIDTH] IN BLOOD BY AUTOMATED COUNT: 12 % (ref 11.6–15.1)
GFR SERPL CREATININE-BSD FRML MDRD: 142 ML/MIN/1.73SQ M
GLUCOSE SERPL-MCNC: 105 MG/DL (ref 65–140)
HCT VFR BLD AUTO: 48 % (ref 36.5–49.3)
HGB BLD-MCNC: 15.6 G/DL (ref 12–17)
IMM GRANULOCYTES # BLD AUTO: 0.05 THOUSAND/UL (ref 0–0.2)
IMM GRANULOCYTES NFR BLD AUTO: 1 % (ref 0–2)
INR PPP: 0.95 (ref 0.84–1.19)
LYMPHOCYTES # BLD AUTO: 2.14 THOUSANDS/ΜL (ref 0.6–4.47)
LYMPHOCYTES NFR BLD AUTO: 20 % (ref 14–44)
MCH RBC QN AUTO: 30.6 PG (ref 26.8–34.3)
MCHC RBC AUTO-ENTMCNC: 32.5 G/DL (ref 31.4–37.4)
MCV RBC AUTO: 94 FL (ref 82–98)
MONOCYTES # BLD AUTO: 0.97 THOUSAND/ΜL (ref 0.17–1.22)
MONOCYTES NFR BLD AUTO: 9 % (ref 4–12)
NEUTROPHILS # BLD AUTO: 7.54 THOUSANDS/ΜL (ref 1.85–7.62)
NEUTS SEG NFR BLD AUTO: 67 % (ref 43–75)
NRBC BLD AUTO-RTO: 0 /100 WBCS
PLATELET # BLD AUTO: 309 THOUSANDS/UL (ref 149–390)
PMV BLD AUTO: 8.6 FL (ref 8.9–12.7)
POTASSIUM SERPL-SCNC: 3.7 MMOL/L (ref 3.5–5.3)
PROT SERPL-MCNC: 7.4 G/DL (ref 6.4–8.2)
PROTHROMBIN TIME: 12.6 SECONDS (ref 11.6–14.5)
RBC # BLD AUTO: 5.09 MILLION/UL (ref 3.88–5.62)
SODIUM SERPL-SCNC: 143 MMOL/L (ref 136–145)
TROPONIN I SERPL-MCNC: <0.02 NG/ML
WBC # BLD AUTO: 10.98 THOUSAND/UL (ref 4.31–10.16)

## 2020-11-17 PROCEDURE — 85730 THROMBOPLASTIN TIME PARTIAL: CPT | Performed by: EMERGENCY MEDICINE

## 2020-11-17 PROCEDURE — 84484 ASSAY OF TROPONIN QUANT: CPT | Performed by: EMERGENCY MEDICINE

## 2020-11-17 PROCEDURE — 36415 COLL VENOUS BLD VENIPUNCTURE: CPT | Performed by: EMERGENCY MEDICINE

## 2020-11-17 PROCEDURE — 85379 FIBRIN DEGRADATION QUANT: CPT | Performed by: EMERGENCY MEDICINE

## 2020-11-17 PROCEDURE — 71045 X-RAY EXAM CHEST 1 VIEW: CPT

## 2020-11-17 PROCEDURE — 99285 EMERGENCY DEPT VISIT HI MDM: CPT | Performed by: EMERGENCY MEDICINE

## 2020-11-17 PROCEDURE — 80053 COMPREHEN METABOLIC PANEL: CPT | Performed by: EMERGENCY MEDICINE

## 2020-11-17 PROCEDURE — 85025 COMPLETE CBC W/AUTO DIFF WBC: CPT | Performed by: EMERGENCY MEDICINE

## 2020-11-17 PROCEDURE — 93005 ELECTROCARDIOGRAM TRACING: CPT

## 2020-11-17 PROCEDURE — 99285 EMERGENCY DEPT VISIT HI MDM: CPT

## 2020-11-17 PROCEDURE — 85610 PROTHROMBIN TIME: CPT | Performed by: EMERGENCY MEDICINE

## 2020-11-17 RX ORDER — TRAMADOL HYDROCHLORIDE 50 MG/1
50 TABLET ORAL ONCE
Status: COMPLETED | OUTPATIENT
Start: 2020-11-17 | End: 2020-11-17

## 2020-11-17 RX ORDER — TRAMADOL HYDROCHLORIDE 50 MG/1
TABLET ORAL
Qty: 8 TABLET | Refills: 0 | Status: SHIPPED | OUTPATIENT
Start: 2020-11-17

## 2020-11-17 RX ORDER — NAPROXEN 500 MG/1
500 TABLET ORAL ONCE
Status: COMPLETED | OUTPATIENT
Start: 2020-11-17 | End: 2020-11-17

## 2020-11-17 RX ORDER — NAPROXEN 500 MG/1
500 TABLET ORAL 2 TIMES DAILY WITH MEALS
Qty: 10 TABLET | Refills: 0 | Status: SHIPPED | OUTPATIENT
Start: 2020-11-17

## 2020-11-17 RX ADMIN — NAPROXEN 500 MG: 500 TABLET ORAL at 23:34

## 2020-11-17 RX ADMIN — TRAMADOL HYDROCHLORIDE 50 MG: 50 TABLET, FILM COATED ORAL at 23:34

## 2020-11-18 LAB
ATRIAL RATE: 75 BPM
P AXIS: 2 DEGREES
PR INTERVAL: 128 MS
QRS AXIS: 26 DEGREES
QRSD INTERVAL: 88 MS
QT INTERVAL: 362 MS
QTC INTERVAL: 404 MS
T WAVE AXIS: 14 DEGREES
VENTRICULAR RATE: 75 BPM

## 2020-11-18 PROCEDURE — 93010 ELECTROCARDIOGRAM REPORT: CPT | Performed by: INTERNAL MEDICINE

## 2023-06-23 ENCOUNTER — NEW PATIENT (OUTPATIENT)
Dept: URBAN - METROPOLITAN AREA CLINIC 20 | Facility: CLINIC | Age: 23
End: 2023-06-23

## 2023-06-23 DIAGNOSIS — H53.022: ICD-10-CM

## 2023-06-23 DIAGNOSIS — H52.02: ICD-10-CM

## 2023-06-23 PROCEDURE — 92015 DETERMINE REFRACTIVE STATE: CPT

## 2023-06-23 PROCEDURE — 92004 COMPRE OPH EXAM NEW PT 1/>: CPT

## 2023-06-23 ASSESSMENT — VISUAL ACUITY
OS_SC: 20/100
OU_SC: 20/20-1
OD_SC: 20/20-1

## 2023-06-23 ASSESSMENT — TONOMETRY
OS_IOP_MMHG: 22
OD_IOP_MMHG: 23

## 2025-04-24 ENCOUNTER — COMPREHENSIVE EXAM (OUTPATIENT)
Age: 25
End: 2025-04-24

## 2025-04-24 DIAGNOSIS — H52.02: ICD-10-CM

## 2025-04-24 DIAGNOSIS — H53.022: ICD-10-CM

## 2025-04-24 PROCEDURE — 92015 DETERMINE REFRACTIVE STATE: CPT

## 2025-04-24 PROCEDURE — 92014 COMPRE OPH EXAM EST PT 1/>: CPT

## (undated) DEVICE — OR2 STERILE LABELS: Brand: CENTURION

## (undated) DEVICE — SUT FIBERSTICK #2 50IN BLUE

## (undated) DEVICE — GROUNDING PAD UNIVERSAL SLW

## (undated) DEVICE — SUT 2 FIBERLOOP AR-7234

## (undated) DEVICE — 10K/24K ARTHROSCOPY INFLOW TUBE SET - DYONICS: Brand: 10K/24K

## (undated) DEVICE — SUSPENSORY LARGE 480-1848-4

## (undated) DEVICE — GLOVE INDICATOR PI UNDERGLOVE SZ 7.5 BLUE

## (undated) DEVICE — EMERALD ARTHROSCOPY SHEET: Brand: CONVERTORS

## (undated) DEVICE — ASTOUND STANDARD SURGICAL GOWN, XL: Brand: CONVERTORS

## (undated) DEVICE — TIBURON SPLIT SHEET: Brand: CONVERTORS

## (undated) DEVICE — 3M™ STERI-STRIP™ REINFORCED ADHESIVE SKIN CLOSURES, R1546, 1/4 IN X 4 IN (6 MM X 100 MM), 10 STRIPS/ENVELOPE: Brand: 3M™ STERI-STRIP™

## (undated) DEVICE — SPONGE GAUZE 4 X 9

## (undated) DEVICE — PACK GENERAL LF

## (undated) DEVICE — BLADE SHAVER EXCALIBUR 5.5MM 130CM COOLCUT

## (undated) DEVICE — 3M™ TEGADERM™ TRANSPARENT FILM DRESSING FRAME STYLE, 1624W, 2-3/8 IN X 2-3/4 IN (6 CM X 7 CM), 100/CT 4CT/CASE: Brand: 3M™ TEGADERM™

## (undated) DEVICE — GLOVE INDICATOR PI UNDERGLOVE SZ 8.5 BLUE

## (undated) DEVICE — INTENDED FOR TISSUE SEPARATION, AND OTHER PROCEDURES THAT REQUIRE A SHARP SURGICAL BLADE TO PUNCTURE OR CUT.: Brand: BARD-PARKER SAFETY BLADES SIZE 15, STERILE

## (undated) DEVICE — SUT VICRYL 2-0 SH 27 IN UNDYED J417H

## (undated) DEVICE — BETADINE SURGICAL SCRUB 32OZ

## (undated) DEVICE — BLADE SHAVER EXCALIBUR 4MM 13CM COOLCUT

## (undated) DEVICE — ACE WRAP 6 IN STERILE

## (undated) DEVICE — ARTHROSCOPY FLOOR MAT

## (undated) DEVICE — SPONGE GAUZE 2 X 2 4PLY STRL

## (undated) DEVICE — SUT ETHILON 4-0 PS-2 18 IN 1667G

## (undated) DEVICE — STANDARD SURGICAL GOWN, L: Brand: CONVERTORS

## (undated) DEVICE — ABDOMINAL PAD: Brand: DERMACEA

## (undated) DEVICE — SUT CHROMIC 2-0 CT-1 27 IN 811H

## (undated) DEVICE — 3M™ TEGADERM™ TRANSPARENT FILM DRESSING FRAME STYLE, 1626W, 4 IN X 4-3/4 IN (10 CM X 12 CM), 50/CT 4CT/CASE: Brand: 3M™ TEGADERM™

## (undated) DEVICE — SUT FIBERLINK #2 26IN AR-7235

## (undated) DEVICE — SUT FIBERWIRE #2 1/2 CIRCLE T-5 38IN AR-7200

## (undated) DEVICE — CUTTER FLIPCUTTER II SHORT 10MM

## (undated) DEVICE — ADHESIVE SKIN HIGH VISCOSITY EXOFIN 1ML

## (undated) DEVICE — NEEDLE 25G X 1 1/2

## (undated) DEVICE — BLADE SHAVER TORPEDO CRV 4MM 13MM COOLCUT

## (undated) DEVICE — ALL PURPOSE SPONGES,NON-WOVEN, 4 PLY: Brand: CURITY

## (undated) DEVICE — SUT VICRYL 4-0 PS-2 18 IN J496G

## (undated) DEVICE — SUT CHROMIC 3-0 SH 27 IN G122H

## (undated) DEVICE — SUT CHROMIC 2-0 18 IN SG13T

## (undated) DEVICE — POV-IOD SOLUTION 4OZ BT

## (undated) DEVICE — SUT TIGERSTICK TIGERWIRE 50IN WHITE/BLACK

## (undated) DEVICE — GLOVE INDICATOR PI UNDERGLOVE SZ 6.5 BLUE

## (undated) DEVICE — GLOVE SRG BIOGEL 8

## (undated) DEVICE — SUT FIBERWIRE #0 1/2 CIRCLE T-4 38IN AR-7250

## (undated) DEVICE — GLOVE SRG BIOGEL 6.5

## (undated) DEVICE — DUAL SPIKE ADAPTER: Brand: CONMED

## (undated) DEVICE — LABEL STERILE (2- POVIDONE IODINE PAINT 2 -POVIDONE IODINE SCRUB 2- CHLOHEXIDINE MOUTHWASH 4 -BLANK MED/SOL

## (undated) DEVICE — BLADE TENDON STRIPPER 9MM

## (undated) DEVICE — TUBING ARTHROSCOPIC WAVE  MAIN PUMP

## (undated) DEVICE — SYRINGE 10ML LL CONTROL TOP

## (undated) DEVICE — INTENDED FOR TISSUE SEPARATION, AND OTHER PROCEDURES THAT REQUIRE A SHARP SURGICAL BLADE TO PUNCTURE OR CUT.: Brand: BARD-PARKER SAFETY BLADES SIZE 11, STERILE

## (undated) DEVICE — UNDYED BRAIDED (POLYGLACTIN 910), SYNTHETIC ABSORBABLE SUTURE: Brand: COATED VICRYL

## (undated) DEVICE — SUT SILK 2-0 SH 30 IN K833H

## (undated) DEVICE — SPONGE LAP 18 X 18 IN

## (undated) DEVICE — PACK ARTHROSCOPY

## (undated) DEVICE — CHLORAPREP HI-LITE 26ML ORANGE

## (undated) DEVICE — BASIC DOUBLE BASIN 2-LF: Brand: MEDLINE INDUSTRIES, INC.

## (undated) DEVICE — SUT SILK 3-0 30 IN SA84H

## (undated) DEVICE — BANDAGE, ESMARK LF STR 6"X9' (20/CS): Brand: CYPRESS

## (undated) DEVICE — TIBURON LAPAROTOMY DRAPE: Brand: CONVERTORS

## (undated) DEVICE — SUT CHROMIC 2-0 SH 27 IN G123H

## (undated) DEVICE — TIBURON PEDIATRIC DRAPE: Brand: CONVERTORS

## (undated) DEVICE — OCCLUSIVE GAUZE STRIP,3% BISMUTH TRIBROMOPHENATE IN PETROLATUM BLEND: Brand: XEROFORM

## (undated) DEVICE — INTENDED FOR TISSUE SEPARATION, AND OTHER PROCEDURES THAT REQUIRE A SHARP SURGICAL BLADE TO PUNCTURE OR CUT.: Brand: BARD-PARKER ® CARBON RIB-BACK BLADES

## (undated) DEVICE — GLOVE SRG BIOGEL 7.5